# Patient Record
Sex: FEMALE | Race: BLACK OR AFRICAN AMERICAN | Employment: FULL TIME | URBAN - METROPOLITAN AREA
[De-identification: names, ages, dates, MRNs, and addresses within clinical notes are randomized per-mention and may not be internally consistent; named-entity substitution may affect disease eponyms.]

---

## 2017-07-20 ENCOUNTER — ED HISTORICAL/CONVERTED ENCOUNTER (OUTPATIENT)
Dept: OTHER | Age: 32
End: 2017-07-20

## 2020-01-07 ENCOUNTER — ED HISTORICAL/CONVERTED ENCOUNTER (OUTPATIENT)
Dept: OTHER | Age: 35
End: 2020-01-07

## 2020-09-28 ENCOUNTER — APPOINTMENT (OUTPATIENT)
Dept: GENERAL RADIOLOGY | Age: 35
End: 2020-09-28
Attending: EMERGENCY MEDICINE
Payer: COMMERCIAL

## 2020-09-28 ENCOUNTER — HOSPITAL ENCOUNTER (EMERGENCY)
Age: 35
Discharge: HOME | End: 2020-09-28
Attending: EMERGENCY MEDICINE
Payer: COMMERCIAL

## 2020-09-28 VITALS
OXYGEN SATURATION: 98 % | WEIGHT: 209 LBS | BODY MASS INDEX: 37.03 KG/M2 | DIASTOLIC BLOOD PRESSURE: 76 MMHG | SYSTOLIC BLOOD PRESSURE: 111 MMHG | HEIGHT: 63 IN | RESPIRATION RATE: 16 BRPM | HEART RATE: 79 BPM

## 2020-09-28 DIAGNOSIS — S39.012A STRAIN OF LUMBAR REGION, INITIAL ENCOUNTER: ICD-10-CM

## 2020-09-28 DIAGNOSIS — S16.1XXA STRAIN OF NECK MUSCLE, INITIAL ENCOUNTER: ICD-10-CM
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
## 2020-09-28 PROCEDURE — 72100 X-RAY EXAM L-S SPINE 2/3 VWS: CPT

## 2020-09-28 PROCEDURE — 72050 X-RAY EXAM NECK SPINE 4/5VWS: CPT

## 2020-09-28 PROCEDURE — 99281 EMR DPT VST MAYX REQ PHY/QHP: CPT

## 2020-09-28 RX ORDER — FLUTICASONE PROPIONATE 50 MCG
1 SPRAY, SUSPENSION (ML) NASAL
COMMUNITY

## 2020-09-28 RX ORDER — AZITHROMYCIN 1 G/1
POWDER, FOR SUSPENSION ORAL
COMMUNITY
Start: 2020-09-14 | End: 2021-08-26

## 2020-09-28 RX ORDER — DULOXETIN HYDROCHLORIDE 60 MG/1
CAPSULE, DELAYED RELEASE ORAL
COMMUNITY
Start: 2020-07-28 | End: 2021-08-26

## 2020-09-28 RX ORDER — HYDROCORTISONE 25 MG/G
CREAM TOPICAL
COMMUNITY
Start: 2020-07-30 | End: 2021-08-26

## 2020-09-28 RX ORDER — DIAZEPAM 2 MG/1
TABLET ORAL
COMMUNITY
Start: 2020-09-28 | End: 2021-08-26

## 2020-09-28 RX ORDER — TRAZODONE HYDROCHLORIDE 100 MG/1
TABLET ORAL
COMMUNITY
Start: 2020-09-28 | End: 2021-08-26
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
## 2021-08-26 ENCOUNTER — HOSPITAL ENCOUNTER (EMERGENCY)
Age: 36
Discharge: HOME | End: 2021-08-26
Attending: FAMILY MEDICINE
Payer: COMMERCIAL

## 2021-08-26 VITALS
DIASTOLIC BLOOD PRESSURE: 91 MMHG | RESPIRATION RATE: 16 BRPM | BODY MASS INDEX: 33.99 KG/M2 | SYSTOLIC BLOOD PRESSURE: 130 MMHG | TEMPERATURE: 98.1 F | HEIGHT: 65 IN | OXYGEN SATURATION: 99 % | HEART RATE: 66 BPM | WEIGHT: 204 LBS

## 2021-08-26 DIAGNOSIS — B97.89 VIRAL SINUSITIS: Primary | ICD-10-CM

## 2021-08-26 DIAGNOSIS — J06.9 UPPER RESPIRATORY TRACT INFECTION, UNSPECIFIED TYPE: ICD-10-CM

## 2021-08-26 DIAGNOSIS — J32.9 VIRAL SINUSITIS: Primary | ICD-10-CM

## 2021-08-26 PROCEDURE — 99281 EMR DPT VST MAYX REQ PHY/QHP: CPT

## 2021-08-26 RX ORDER — PREDNISONE 20 MG/1
40 TABLET ORAL DAILY
Qty: 10 TABLET | Refills: 0 | Status: SHIPPED | OUTPATIENT
Start: 2021-08-26 | End: 2021-08-31

## 2021-08-26 NOTE — ED PROVIDER NOTES
EMERGENCY DEPARTMENT HISTORY AND PHYSICAL EXAM      Date: 8/26/2021  Patient Name: Kodi Lee    History of Presenting Illness     Chief Complaint   Patient presents with    Sinus Infection       History Provided By:     HPI: Kodi Lee, is an extremely pleasant 39 y.o. female presenting to the ED with a chief complaint of sinus infection. Patient endorses tenderness of her sinuses for 2 days. She is also feeling congested. No thick drainage from her nares. Dry cough as well. No chest pain or shortness of breath. No fevers, chills or rigors. She is eating and drinking well. No alleviating or aggravating factors. There are no other complaints, changes, or physical findings at this time. PCP: None    No current facility-administered medications on file prior to encounter. Current Outpatient Medications on File Prior to Encounter   Medication Sig Dispense Refill    fluticasone propionate (FLONASE) 50 mcg/actuation nasal spray 1 Spray by Nasal route.  [DISCONTINUED] azithromycin (ZITHROMAX) 1 gram powder DISSOLVE 1 PACKET IN 2 OUNCES OF WATER AND TAKE BY MOUTH AS A ONE TIME DOSE      [DISCONTINUED] diazePAM (VALIUM) 2 mg tablet       [DISCONTINUED] DULoxetine (CYMBALTA) 60 mg capsule TAKE 1 CAPSULE BY MOUTH ONCE DAILY      [DISCONTINUED] hydrocortisone (HYTONE) 2.5 % topical cream APPLY CREAM TO AFFECTED AREA ON THE FACE TWICE DAILY FOR UP TO TWO WEEKS THEN AS NEEDED FOR FLARES. DO NOT USE FOR MORE THAN TWO WEEKS AT A      [DISCONTINUED] traZODone (DESYREL) 100 mg tablet       [DISCONTINUED] HYDROcodone-acetaminophen (NORCO) 5-325 mg per tablet Take 1-2 Tabs by mouth every six (6) hours as needed. Max Daily Amount: 8 Tabs. 20 Tab 0    [DISCONTINUED] ibuprofen (MOTRIN) 800 mg tablet Take 1 Tab by mouth every eight (8) hours. 21 Tab 1    [DISCONTINUED] ranitidine (ZANTAC) 150 mg tablet Take 150 mg by mouth two (2) times a day.  Indications: HEARTBURN      [DISCONTINUED] pnv w/o calcium-iron fum-fa 27-1 mg tab Take  by mouth. Indications: PREGNANCY         Past History     Past Medical History:  Past Medical History:   Diagnosis Date    Asthma     as a child    HX OTHER MEDICAL     GERD    Psychiatric disorder     Sickle-cell disease, unspecified     trait       Past Surgical History:  No past surgical history on file. Family History:  No family history on file. Social History:  Social History     Tobacco Use    Smoking status: Never Smoker    Smokeless tobacco: Never Used   Substance Use Topics    Alcohol use: No    Drug use: No       Allergies: Allergies   Allergen Reactions    Iodine Swelling    Keflex [Cephalexin] Hives         Review of Systems     Review of Systems   Constitutional: Negative for activity change, appetite change, chills, fatigue and fever. HENT: Negative for sore throat. See HPI   Eyes: Negative for photophobia and visual disturbance. Respiratory: Positive for cough. Negative for shortness of breath and wheezing. Cardiovascular: Negative for chest pain, palpitations and leg swelling. Gastrointestinal: Negative for abdominal pain, diarrhea, nausea and vomiting. Endocrine: Negative for cold intolerance and heat intolerance. Musculoskeletal: Negative for gait problem and joint swelling. Skin: Negative for color change and rash. Neurological: Negative for dizziness and headaches. Physical Exam     Physical Exam  Constitutional:       Appearance: She is well-developed. HENT:      Head: Normocephalic and atraumatic. Nose: Congestion present. Mouth/Throat:      Mouth: Mucous membranes are moist.      Pharynx: Oropharynx is clear. Eyes:      Conjunctiva/sclera: Conjunctivae normal.      Pupils: Pupils are equal, round, and reactive to light. Cardiovascular:      Rate and Rhythm: Normal rate and regular rhythm. Heart sounds: No murmur heard. Pulmonary:      Effort: No respiratory distress.       Breath sounds: No stridor. No wheezing, rhonchi or rales. Abdominal:      General: There is no distension. Tenderness: There is no abdominal tenderness. There is no rebound. Musculoskeletal:      Cervical back: Normal range of motion and neck supple. Skin:     General: Skin is warm and dry. Neurological:      General: No focal deficit present. Mental Status: She is alert and oriented to person, place, and time. Psychiatric:         Mood and Affect: Mood normal.         Behavior: Behavior normal.         Lab and Diagnostic Study Results     Labs -   No results found for this or any previous visit (from the past 12 hour(s)). Radiologic Studies -   @lastxrresult@  CT Results  (Last 48 hours)    None        CXR Results  (Last 48 hours)    None            Medical Decision Making   - I am the first provider for this patient. - I reviewed the vital signs, available nursing notes, past medical history, past surgical history, family history and social history. - Initial assessment performed. The patients presenting problems have been discussed, and they are in agreement with the care plan formulated and outlined with them. I have encouraged them to ask questions as they arise throughout their visit. Vital Signs-Reviewed the patient's vital signs. Patient Vitals for the past 12 hrs:   Temp Pulse Resp BP SpO2   08/26/21 0904 98.1 °F (36.7 °C) 66 16 (!) 130/91 99 %         ED Course/ Provider Notes (Medical Decision Making):     Patient presented to the emergency department with a chief complaint of sinus pressure, congestion, dry cough  On examination the patient is nontoxic and well-appearing. Vitals were reviewed per above. Lungs are clear to auscultation bilaterally. Oxygen saturation 99% on room air. Noted nasal congestion. No signs of bacterial sinusitis. Prescription for prednisone for viral sinusitis and likely self-limiting viral URI.     Alexander Toussaint was given a thorough list of signs and symptoms that would warrant an immediate return to the emergency department. Otherwise Kodi Lee will follow up with PCP. Procedures   Medical Decision Makingedical Decision Making  Performed by: Lucia Bruno DO  Procedures  None       Disposition   Disposition:     Home     All of the diagnostic tests were reviewed and questions answered. Diagnosis, care plan and treatment options were discussed. The patient understands the instructions and will follow up as directed. The patients results have been reviewed with them. They have been counseled regarding their diagnosis. The patient verbally convey understanding and agreement of the signs, symptoms, diagnosis, treatment and prognosis and additionally agrees to follow up as recommended with their PCP in 24 - 48 hours. They also agree with the care-plan and convey that all of their questions have been answered. I have also put together some discharge instructions for them that include: 1) educational information regarding their diagnosis, 2) how to care for their diagnosis at home, as well a 3) list of reasons why they would want to return to the ED prior to their follow-up appointment, should their condition change. DISCHARGE PLAN:    1. Current Discharge Medication List      START taking these medications    Details   predniSONE (DELTASONE) 20 mg tablet Take 40 mg by mouth daily for 5 days. With Breakfast  Qty: 10 Tablet, Refills: 0         CONTINUE these medications which have NOT CHANGED    Details   fluticasone propionate (FLONASE) 50 mcg/actuation nasal spray 1 [place] by Nasal route. 2.   Follow-up Information     Follow up With Specialties Details Why Contact Info    Your primary care doctor  Schedule an appointment as soon as possible for a visit               3.  Return to ED if worse       4.    Current Discharge Medication List      START taking these medications    Details   predniSONE (DELTASONE) 20 mg tablet Take 40 mg by mouth daily for 5 days. With Breakfast  Qty: 10 Tablet, Refills: 0  Start date: 8/26/2021, End date: 8/31/2021               Diagnosis     Clinical Impression:    1. Viral sinusitis    2. Upper respiratory tract infection, unspecified type        Attestations:    Cristal Batista, DO    Please note that this dictation was completed with [employer], the computer voice recognition software. Quite often unanticipated grammatical, syntax, homophones, and other interpretive errors are inadvertently transcribed by the computer software. Please disregard these errors. Please excuse any errors that have escaped final proofreading. Thank you.

## 2021-09-29 ENCOUNTER — OFFICE VISIT (OUTPATIENT)
Dept: ENDOCRINOLOGY | Age: 36
End: 2021-09-29
Payer: COMMERCIAL

## 2021-09-29 VITALS
DIASTOLIC BLOOD PRESSURE: 38 MMHG | HEART RATE: 81 BPM | SYSTOLIC BLOOD PRESSURE: 129 MMHG | BODY MASS INDEX: 33.63 KG/M2 | OXYGEN SATURATION: 98 % | WEIGHT: 197 LBS | HEIGHT: 64 IN

## 2021-09-29 DIAGNOSIS — E66.9 OBESITY (BMI 30-39.9): ICD-10-CM

## 2021-09-29 DIAGNOSIS — E28.2 PCOS (POLYCYSTIC OVARIAN SYNDROME): ICD-10-CM

## 2021-09-29 DIAGNOSIS — E03.9 HYPOTHYROIDISM, UNSPECIFIED TYPE: Primary | ICD-10-CM

## 2021-09-29 DIAGNOSIS — R53.83 FATIGUE, UNSPECIFIED TYPE: ICD-10-CM

## 2021-09-29 LAB
CHP ED QC CHECK: NORMAL
GLUCOSE BLD-MCNC: 95 MG/DL
HBA1C MFR BLD: 3.5 %

## 2021-09-29 PROCEDURE — 99203 OFFICE O/P NEW LOW 30 MIN: CPT | Performed by: INTERNAL MEDICINE

## 2021-09-29 PROCEDURE — 83036 HEMOGLOBIN GLYCOSYLATED A1C: CPT | Performed by: INTERNAL MEDICINE

## 2021-09-29 PROCEDURE — 82962 GLUCOSE BLOOD TEST: CPT | Performed by: INTERNAL MEDICINE

## 2021-09-29 RX ORDER — METFORMIN HYDROCHLORIDE 500 MG/1
1000 TABLET, EXTENDED RELEASE ORAL 2 TIMES DAILY
COMMUNITY
Start: 2021-09-14 | End: 2022-05-05 | Stop reason: SDUPTHER

## 2021-09-29 RX ORDER — BUPROPION HYDROCHLORIDE 150 MG/1
300 TABLET ORAL NIGHTLY
COMMUNITY
Start: 2021-07-28 | End: 2022-08-03

## 2021-09-29 ASSESSMENT — ENCOUNTER SYMPTOMS
EYES NEGATIVE: 1
ABDOMINAL PAIN: 1
RESPIRATORY NEGATIVE: 1
CONSTIPATION: 1

## 2021-09-29 NOTE — PROGRESS NOTES
9/29/2021    Assessment:       Diagnosis Orders   1. Hypothyroidism, unspecified type  TSH with Reflex    T4, Free    POCT Glucose    POCT glycosylated hemoglobin (Hb A1C)   2. Obesity (BMI 30-39.9)     3. PCOS (polycystic ovarian syndrome)     4.  Fatigue, unspecified type           PLAN:     Orders Placed This Encounter   Procedures    TSH with Reflex     Standing Status:   Future     Standing Expiration Date:   9/29/2022    T4, Free     Standing Status:   Future     Standing Expiration Date:   9/29/2022    Thyroid Peroxidase Antibody     Standing Status:   Future     Number of Occurrences:   1     Standing Expiration Date:   9/29/2022    Basic Metabolic Panel     Standing Status:   Future     Number of Occurrences:   1     Standing Expiration Date:   9/29/2022    Insulin, Total     Standing Status:   Future     Number of Occurrences:   1     Standing Expiration Date:   9/29/2022    Testosterone Free And Total, Non Male     Standing Status:   Future     Number of Occurrences:   1     Standing Expiration Date:   9/29/2022    POCT Glucose    POCT glycosylated hemoglobin (Hb A1C)     Continue current dose of Metformin labs to be done for total and free testosterone insulin level BMP thyroid function test thyroid antibodies for the treatment was based on the results of her labs more than 50% of 30 minutes spent patient education counseling      Orders Placed This Encounter   Procedures    POCT Glucose    POCT glycosylated hemoglobin (Hb A1C)       Subjective:     Chief Complaint   Patient presents with    New Patient    Hypothyroidism    Diabetes    Obesity     Vitals:    09/29/21 1125 09/29/21 1132   BP: (!) 146/91 (!) 129/38   Pulse: 81    SpO2: 98%    Weight: 197 lb (89.4 kg)    Height: 5' 4\" (1.626 m)      Wt Readings from Last 3 Encounters:   09/29/21 197 lb (89.4 kg)     BP Readings from Last 3 Encounters:   09/29/21 (!) 129/38     Patient self-referred here for obesity possible polycystic ovary syndrome hypothyroidism  Started on Metformin 500mg once or twice a day patient has not seen any relief from that complains of minutes. And also heavy periods pelvic cramps had a pelvic ultrasound done recently which was reviewed showing some cyst patient has had one pregnancy but miscarried in 2008 or 8 no children at this time  Has also gained 20 pounds over the last year or so  History of hypothyroidism many years ago was put on thyroid replacement but later was discontinued family history of thyroid disorder history of diabetes  Patient also complains of occasional symptoms of hypoglycemia improved with food suggestive of insulin resistance  Patient denies any facial hair hair loss acne    Other  This is a new (PCOS/insulin resistance) problem. The current episode started more than 1 year ago. The problem occurs intermittently. The problem has been waxing and waning. Associated symptoms include abdominal pain and fatigue. Pertinent negatives include no diaphoresis or weakness. Associated symptoms comments: Weight gain irregular menstrual periods. Nothing aggravates the symptoms. Treatments tried: Metformin. The treatment provided no relief. History of hypothyroidism currently not on replacement was started on Synthroid many years ago was discontinued later by primary care complains of weight gain fatigue difficulty losing weight    EXAMINATION: US PELVIS AND TRANSVAG   HISTORY: Irregular periods   COMPARISON: No relevant comparison available. TECHNIQUE: Transabdominal and transvaginal sonographic examination. FINDINGS:   UTERUS: Normal size and appearance. Uterus size: 8.5 x 5.3 x 4.5 cm. ENDOMETRIUM: Normal homogeneous appearance. Endometrial thickness: 7 mm   RIGHT OVARY: Contains a 1.8 cm dominant follicle. Duplex Doppler demonstrates   normal waveform and flow; resistive index 0.47. Ovary size: 3.6 x 2.3 x 2.1 cm   LEFT OVARY: Normal size and appearance.  Duplex Doppler demonstrates normal waveform and flow; resistive index 0.46. Ovary size: 2.8 x 2.2 x 1.6 cm   CUL-DE-SAC: Unremarkable. No significant free fluid. BLADDER: Unremarkable. OTHER: None. IMPRESSION:   1. No abnormal or suspicious findings to account for patient's symptoms. Electronically authenticated by: Brent Cox Date: 2021-09-13 13:11     No past medical history on file. No past surgical history on file. Social History     Socioeconomic History    Marital status:      Spouse name: Not on file    Number of children: Not on file    Years of education: Not on file    Highest education level: Not on file   Occupational History    Not on file   Tobacco Use    Smoking status: Never Smoker    Smokeless tobacco: Never Used   Substance and Sexual Activity    Alcohol use: Not on file    Drug use: Not on file    Sexual activity: Not on file   Other Topics Concern    Not on file   Social History Narrative    Not on file     Social Determinants of Health     Financial Resource Strain:     Difficulty of Paying Living Expenses:    Food Insecurity:     Worried About Running Out of Food in the Last Year:     [address] in the Last Year:    Transportation Needs:     Lack of Transportation (Medical):  Lack of Transportation (Non-Medical):    Physical Activity:     Days of Exercise per Week:     Minutes of Exercise per Session:    Stress:     Feeling of Stress :    Social Connections:     Frequency of Communication with Friends and Family:     Frequency of Social Gatherings with Friends and Family:     Attends Nondenominational Services:     Active Member of Clubs or Organizations:     Attends Club or Organization Meetings:     Marital Status:    Intimate Partner Violence:     Fear of Current or Ex-Partner:     Emotionally Abused:     Physically Abused:     Sexually Abused:      No family history on file.   No Known Allergies    Current Outpatient Medications:     metFORMIN (GLUCOPHAGE-XR) 500 MG extended release tablet, Take 500 mg by mouth nightly, Disp: , Rfl:     buPROPion (WELLBUTRIN XL) 150 MG extended release tablet, Take 150 mg by mouth nightly, Disp: , Rfl:   Lab Results   Component Value Date    GLUCOSE 95 09/29/2021     No results found for: WBC, HGB, HCT, MCV, PLT  No results found for: LABA1C  No results found for: CHOLFAST, TRIGLYCFAST, HDL, LDLCALC, CHOL, TRIG  No results found for: TESTM  No results found for: TSH, TSHREFLEX, FT3, T4FREE  No results found for: TPOABS    Review of Systems   Constitutional: Positive for fatigue and unexpected weight change. Negative for diaphoresis. Eyes: Negative. Respiratory: Negative. Cardiovascular: Negative. Gastrointestinal: Positive for abdominal pain and constipation. Endocrine: Negative. Negative for cold intolerance and heat intolerance. Genitourinary: Positive for menstrual problem and pelvic pain. Skin: Negative. Neurological: Negative for weakness. Psychiatric/Behavioral: Positive for dysphoric mood. All other systems reviewed and are negative. Objective:   Physical Exam  Vitals reviewed. Constitutional:       General: She is not in acute distress. Appearance: Normal appearance. She is obese. HENT:      Head: Normocephalic and atraumatic. Hair is normal.      Right Ear: External ear normal.      Left Ear: External ear normal.      Nose: Nose normal.      Mouth/Throat:      Pharynx: Oropharynx is clear. Eyes:      General: No scleral icterus. Right eye: No discharge. Left eye: No discharge. Extraocular Movements: Extraocular movements intact. Conjunctiva/sclera: Conjunctivae normal.   Neck:      Trachea: Trachea normal.   Cardiovascular:      Rate and Rhythm: Normal rate and regular rhythm. Heart sounds: Normal heart sounds. Pulmonary:      Effort: Pulmonary effort is normal.      Breath sounds: Normal breath sounds. No wheezing or rhonchi.    Abdominal:      Palpations: Abdomen is soft. Musculoskeletal:         General: Normal range of motion. Cervical back: Normal range of motion and neck supple. Skin:     General: Skin is warm and dry. Neurological:      General: No focal deficit present. Mental Status: She is alert and oriented to person, place, and time.    Psychiatric:         Mood and Affect: Mood normal.         Behavior: Behavior normal.

## 2021-10-03 ENCOUNTER — HOSPITAL ENCOUNTER (EMERGENCY)
Age: 36
Discharge: LEFT BEFORE DISCHARGE | End: 2021-10-03
Admitting: EMERGENCY MEDICINE
Payer: MEDICAID

## 2021-10-03 VITALS
HEART RATE: 66 BPM | HEIGHT: 66 IN | OXYGEN SATURATION: 97 % | BODY MASS INDEX: 32.14 KG/M2 | WEIGHT: 200 LBS | DIASTOLIC BLOOD PRESSURE: 91 MMHG | SYSTOLIC BLOOD PRESSURE: 120 MMHG | TEMPERATURE: 99 F | RESPIRATION RATE: 16 BRPM

## 2021-10-03 PROCEDURE — 75810000275 HC EMERGENCY DEPT VISIT NO LEVEL OF CARE

## 2021-10-03 RX ORDER — DIAZEPAM 2 MG/1
2 TABLET ORAL
COMMUNITY

## 2021-10-03 NOTE — ED TRIAGE NOTES
Yesterday morning h/a, intermittent rash scattered, goes away when takes Benadryl, return when Benadryl wears off

## 2021-10-04 ENCOUNTER — HOSPITAL ENCOUNTER (EMERGENCY)
Age: 36
Discharge: HOME | End: 2021-10-04
Payer: COMMERCIAL

## 2021-10-04 VITALS
OXYGEN SATURATION: 98 % | BODY MASS INDEX: 33.32 KG/M2 | SYSTOLIC BLOOD PRESSURE: 123 MMHG | WEIGHT: 200 LBS | HEIGHT: 65 IN | RESPIRATION RATE: 14 BRPM | TEMPERATURE: 98.2 F | DIASTOLIC BLOOD PRESSURE: 77 MMHG | HEART RATE: 83 BPM

## 2021-10-04 DIAGNOSIS — R51.9 SINUS HEADACHE: Primary | ICD-10-CM

## 2021-10-04 DIAGNOSIS — L50.1 URTICARIA, IDIOPATHIC: ICD-10-CM

## 2021-10-04 DIAGNOSIS — T78.40XA ALLERGIC REACTION, INITIAL ENCOUNTER: ICD-10-CM

## 2021-10-04 PROCEDURE — 99282 EMERGENCY DEPT VISIT SF MDM: CPT

## 2021-10-04 RX ORDER — EPINEPHRINE 0.3 MG/.3ML
0.3 INJECTION SUBCUTANEOUS
Qty: 1 EACH | Refills: 0 | Status: SHIPPED | OUTPATIENT
Start: 2021-10-04 | End: 2021-10-04

## 2021-10-04 RX ORDER — PREDNISONE 10 MG/1
TABLET ORAL
Qty: 21 TABLET | Refills: 0 | Status: SHIPPED | OUTPATIENT
Start: 2021-10-04 | End: 2022-08-04

## 2021-10-04 RX ORDER — LORATADINE 10 MG/1
10 TABLET ORAL DAILY
Qty: 30 TABLET | Refills: 0 | Status: SHIPPED | OUTPATIENT
Start: 2021-10-04 | End: 2021-11-03

## 2021-10-04 RX ORDER — AMOXICILLIN AND CLAVULANATE POTASSIUM 875; 125 MG/1; MG/1
1 TABLET, FILM COATED ORAL 2 TIMES DAILY
Qty: 20 TABLET | Refills: 0 | Status: SHIPPED | OUTPATIENT
Start: 2021-10-04 | End: 2021-10-14
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
## 2021-10-04 NOTE — ED TRIAGE NOTES
Intermittent small patches of rash that \"pops up on my wrist and my neck\" x3 days. States \"you guys were too busy yesterday, so I left\". States took benadryl last around noon today, now c/o HA; denies rash, difficulty breathing, other associated c/o.

## 2021-10-29 ENCOUNTER — OFFICE VISIT (OUTPATIENT)
Dept: ENDOCRINOLOGY | Age: 36
End: 2021-10-29
Payer: COMMERCIAL

## 2021-10-29 VITALS
DIASTOLIC BLOOD PRESSURE: 79 MMHG | WEIGHT: 198 LBS | OXYGEN SATURATION: 98 % | HEART RATE: 75 BPM | BODY MASS INDEX: 37.38 KG/M2 | HEIGHT: 61 IN | SYSTOLIC BLOOD PRESSURE: 114 MMHG

## 2021-10-29 DIAGNOSIS — E28.2 PCOS (POLYCYSTIC OVARIAN SYNDROME): ICD-10-CM

## 2021-10-29 DIAGNOSIS — E06.3 HASHIMOTO'S THYROIDITIS: ICD-10-CM

## 2021-10-29 DIAGNOSIS — E03.9 HYPOTHYROIDISM, UNSPECIFIED TYPE: ICD-10-CM

## 2021-10-29 DIAGNOSIS — E03.9 HYPOTHYROIDISM, UNSPECIFIED TYPE: Primary | ICD-10-CM

## 2021-10-29 LAB
T4 FREE: 1.15 NG/DL (ref 0.84–1.68)
TSH REFLEX: 1.78 UIU/ML (ref 0.44–3.86)

## 2021-10-29 PROCEDURE — 99213 OFFICE O/P EST LOW 20 MIN: CPT | Performed by: INTERNAL MEDICINE

## 2021-10-29 NOTE — PROGRESS NOTES
10/29/2021    Assessment:       Diagnosis Orders   1. Hypothyroidism, unspecified type  TSH with Reflex    T4, Free   2. PCOS (polycystic ovarian syndrome)     3. Hashimoto's thyroiditis           PLAN:     Patient educated on Hashimoto thyroiditis to get thyroid function test done today follow-up in 4 weeks  Orders Placed This Encounter   Procedures    TSH with Reflex     Standing Status:   Future     Standing Expiration Date:   10/29/2022    T4, Free     Standing Status:   Future     Standing Expiration Date:   10/29/2022       Subjective:     Chief Complaint   Patient presents with    Hypothyroidism    Obesity    Other     PCOS     Vitals:    10/29/21 1000   BP: 114/79   Pulse: 75   SpO2: 98%   Weight: 198 lb (89.8 kg)   Height: 5' 1\" (1.549 m)     Wt Readings from Last 3 Encounters:   10/29/21 198 lb (89.8 kg)   09/29/21 197 lb (89.4 kg)     BP Readings from Last 3 Encounters:   10/29/21 114/79   09/29/21 (!) 129/38     Patient is here for follow-up on her hypothyroidism not on replacement no known history of Hashimoto thyroiditis  Has had history of miscarriages  History of polycystic ovary syndrome on Metformin  Labs were reviewed showing normal glucose insulin level thyroid antibodies high consistent with Hashimoto thyroiditis  Testosterone levels were normal    Other  This is a chronic (Hypothyroidism) problem. The current episode started more than 1 year ago. The problem has been waxing and waning. Associated symptoms include fatigue. Nothing aggravates the symptoms. She has tried nothing for the symptoms. Results for Gael Fraire (MRN [id number]) as of 10/29/2021 11:00   Ref.  Range 9/29/2021 12:55   Sodium Latest Ref Range: 135 - 144 mEq/L 139   Potassium Latest Ref Range: 3.4 - 4.9 mEq/L 4.3   Chloride Latest Ref Range: 95 - 107 mEq/L 100   CO2 Latest Ref Range: 20 - 31 mEq/L 27   BUN Latest Ref Range: 6 - 20 mg/dL 10   Creatinine Latest Ref Range: 0.50 - 0.90 mg/dL 0.67   Anion Gap Latest Ref Range: 9 - 15 mEq/L 12   GFR Non- Latest Ref Range: >60  >60.0   GFR African American Latest Ref Range: >60  >60.0   Glucose Latest Ref Range: 70 - 99 mg/dL 84   Calcium Latest Ref Range: 8.5 - 9.9 mg/dL 9.3   Sex Hormone Binding Latest Ref Range: 30 - 135 nmol/L 37   Testosterone, Free Latest Ref Range: 1.3 - 9.2 pg/mL 3.3   Testosterone, Females/Children Latest Ref Range: 9 - 55 ng/dL 21   Insulin Latest Ref Range: 2.6 - 24.9 uIU/mL 6.6   THYROID PEROXIDASE (TPO) ABS Latest Ref Range: 0.0 - 25.0 IU/mL 205.0 (H)       History reviewed. No pertinent past medical history. History reviewed. No pertinent surgical history. Social History     Socioeconomic History    Marital status:      Spouse name: Not on file    Number of children: Not on file    Years of education: Not on file    Highest education level: Not on file   Occupational History    Not on file   Tobacco Use    Smoking status: Never Smoker    Smokeless tobacco: Never Used   Substance and Sexual Activity    Alcohol use: Not on file    Drug use: Not on file    Sexual activity: Not on file   Other Topics Concern    Not on file   Social History Narrative    Not on file     Social Determinants of Health     Financial Resource Strain:     Difficulty of Paying Living Expenses:    Food Insecurity:     Worried About Running Out of Food in the Last Year:     [address] in the Last Year:    Transportation Needs:     Lack of Transportation (Medical):      Lack of Transportation (Non-Medical):    Physical Activity:     Days of Exercise per Week:     Minutes of Exercise per Session:    Stress:     Feeling of Stress :    Social Connections:     Frequency of Communication with Friends and Family:     Frequency of Social Gatherings with Friends and Family:     Attends [employer] Services:     Active Member of Clubs or Organizations:     Attends Club or Organization Meetings:     Marital Status:    Intimate Partner Violence:     Fear of Current or Ex-Partner:     Emotionally Abused:     Physically Abused:     Sexually Abused:      History reviewed. No pertinent family history. No Known Allergies    Current Outpatient Medications:     metFORMIN (GLUCOPHAGE-XR) 500 MG extended release tablet, Take 500 mg by mouth nightly, Disp: , Rfl:     buPROPion (WELLBUTRIN XL) 150 MG extended release tablet, Take 150 mg by mouth nightly, Disp: , Rfl:   Lab Results   Component Value Date     09/29/2021    K 4.3 09/29/2021     09/29/2021    CO2 27 09/29/2021    BUN 10 09/29/2021    CREATININE 0.67 09/29/2021    GLUCOSE 84 09/29/2021    CALCIUM 9.3 09/29/2021    LABGLOM >60.0 09/29/2021    GFRAA >60.0 09/29/2021     No results found for: WBC, HGB, HCT, MCV, PLT  Lab Results   Component Value Date    LABA1C 3.5 09/29/2021     No results found for: CHOLFAST, TRIGLYCFAST, HDL, LDLCALC, CHOL, TRIG  No results found for: TESTM  No results found for: TSH, TSHREFLEX, FT3, T4FREE  Lab Results   Component Value Date    TPOABS 205.0 (H) 09/29/2021       Review of Systems   Constitutional: Positive for fatigue. Objective:   Physical Exam  Vitals reviewed. Constitutional:       Appearance: Normal appearance. She is obese. HENT:      Head: Normocephalic and atraumatic. Hair is normal.      Right Ear: External ear normal.      Left Ear: External ear normal.      Nose: Nose normal.   Eyes:      General: No scleral icterus. Right eye: No discharge. Left eye: No discharge. Extraocular Movements: Extraocular movements intact. Conjunctiva/sclera: Conjunctivae normal.   Neck:      Trachea: Trachea normal.   Cardiovascular:      Rate and Rhythm: Normal rate. Pulmonary:      Effort: Pulmonary effort is normal.   Musculoskeletal:         General: Normal range of motion. Cervical back: Normal range of motion and neck supple. Neurological:      General: No focal deficit present.       Mental Status: She is alert and oriented to person, place, and time.    Psychiatric:         Mood and Affect: Mood normal.         Behavior: Behavior normal.

## 2021-11-29 ENCOUNTER — OFFICE VISIT (OUTPATIENT)
Dept: ENDOCRINOLOGY | Age: 36
End: 2021-11-29
Payer: COMMERCIAL

## 2021-11-29 VITALS
SYSTOLIC BLOOD PRESSURE: 118 MMHG | HEIGHT: 61 IN | WEIGHT: 202 LBS | DIASTOLIC BLOOD PRESSURE: 83 MMHG | BODY MASS INDEX: 38.14 KG/M2 | HEART RATE: 81 BPM

## 2021-11-29 DIAGNOSIS — E03.9 HYPOTHYROIDISM, UNSPECIFIED TYPE: Primary | ICD-10-CM

## 2021-11-29 DIAGNOSIS — E04.9 GOITER: ICD-10-CM

## 2021-11-29 DIAGNOSIS — E06.3 HASHIMOTO'S THYROIDITIS: ICD-10-CM

## 2021-11-29 DIAGNOSIS — E28.2 PCOS (POLYCYSTIC OVARIAN SYNDROME): ICD-10-CM

## 2021-11-29 PROCEDURE — 99213 OFFICE O/P EST LOW 20 MIN: CPT | Performed by: INTERNAL MEDICINE

## 2021-11-29 ASSESSMENT — ENCOUNTER SYMPTOMS: TROUBLE SWALLOWING: 0

## 2021-11-29 NOTE — PROGRESS NOTES
11/29/2021    Assessment:       Diagnosis Orders   1. Hypothyroidism, unspecified type  TSH without Reflex    T4, Free   2. Goiter  US HEAD NECK SOFT TISSUE THYROID   3. PCOS (polycystic ovarian syndrome)     4. Hashimoto's thyroiditis           PLAN:     Continue Metformin 500 mg  at current dose  For thyroid replacement  Get thyroid ultrasound  Follow-up in 2 to 3 months    Subjective:     Chief Complaint   Patient presents with    Hypothyroidism    Other     PCOS     Vitals:    11/29/21 1347   BP: 118/83   Site: Left Upper Arm   Position: Sitting   Cuff Size: Large Adult   Pulse: 81   Weight: 202 lb (91.6 kg)   Height: 5' 1\" (1.549 m)     Wt Readings from Last 3 Encounters:   11/29/21 202 lb (91.6 kg)   11/17/21 201 lb (91.2 kg)   10/29/21 198 lb (89.8 kg)     BP Readings from Last 3 Encounters:   11/29/21 118/83   11/17/21 (!) 132/90   10/29/21 114/79     Follow-up on the polycystic ovary syndrome Hashimoto thyroiditis thyroid function test was done which was normal patient denies any neck pain difficulty swallowing obesity with BMI of 38    Other  This is a recurrent (Hashimoto thyroiditis) problem. The current episode started more than 1 month ago. The problem has been waxing and waning. Pertinent negatives include no neck pain. Nothing aggravates the symptoms. She has tried nothing for the symptoms. Results for Barbie Sykes (MRN [id number]) as of 11/29/2021 14:07   Ref.  Range 9/29/2021 12:55 10/29/2021 10:32   Sodium Latest Ref Range: 135 - 144 mEq/L 139    Potassium Latest Ref Range: 3.4 - 4.9 mEq/L 4.3    Chloride Latest Ref Range: 95 - 107 mEq/L 100    CO2 Latest Ref Range: 20 - 31 mEq/L 27    BUN Latest Ref Range: 6 - 20 mg/dL 10    Creatinine Latest Ref Range: 0.50 - 0.90 mg/dL 0.67    Anion Gap Latest Ref Range: 9 - 15 mEq/L 12    GFR Non- Latest Ref Range: >60  >60.0    GFR African American Latest Ref Range: >60  >60.0    Glucose Latest Ref Range: 70 - 99 mg/dL 84    Calcium Latest Ref Range: 8.5 - 9.9 mg/dL 9.3    Sex Hormone Binding Latest Ref Range: 30 - 135 nmol/L 37    Testosterone, Free Latest Ref Range: 1.3 - 9.2 pg/mL 3.3    Testosterone, Females/Children Latest Ref Range: 9 - 55 ng/dL 21    Insulin Latest Ref Range: 2.6 - 24.9 uIU/mL 6.6    TSH Latest Ref Range: 0.440 - 3.860 uIU/mL  1.780   THYROID PEROXIDASE (TPO) ABS Latest Ref Range: 0.0 - 25.0 IU/mL 205.0 (H)    T4 Free Latest Ref Range: 0.84 - 1.68 ng/dL  1.15       Past Medical History:   Diagnosis Date    Hyperthyroidism     PCOS (polycystic ovarian syndrome)     Prediabetes      No past surgical history on file. Social History     Socioeconomic History    Marital status:      Spouse name: Not on file    Number of children: Not on file    Years of education: Not on file    Highest education level: Not on file   Occupational History    Not on file   Tobacco Use    Smoking status: Never Smoker    Smokeless tobacco: Never Used   Vaping Use    Vaping Use: Never used   Substance and Sexual Activity    Alcohol use: Never    Drug use: Never    Sexual activity: Yes   Other Topics Concern    Not on file   Social History Narrative    Not on file     Social Determinants of Health     Financial Resource Strain: Low Risk     Difficulty of Paying Living Expenses: Not hard at all   Food Insecurity: No Food Insecurity    Worried About Running Out of Food in the Last Year: Never true    [address] in the Last Year: Never true   Transportation Needs:     Lack of Transportation (Medical): Not on file    Lack of Transportation (Non-Medical):  Not on file   Physical Activity:     Days of Exercise per Week: Not on file    Minutes of Exercise per Session: Not on file   Stress:     Feeling of Stress : Not on file   Social Connections:     Frequency of Communication with Friends and Family: Not on file    Frequency of Social Gatherings with Friends and Family: Not on file    Attends [employer] Services: Not on file   [address] [place] or Organizations: Not on file    Attends Club or Organization Meetings: Not on file    Marital Status: Not on file   Intimate Partner Violence:     Fear of Current or Ex-Partner: Not on file    Emotionally Abused: Not on file    Physically Abused: Not on file    Sexually Abused: Not on file   Housing Stability:     Unable to Pay for Housing in the Last Year: Not on file    Number of [place] in the Last Year: Not on file    Unstable Housing in the Last Year: Not on file     No family history on file. No Known Allergies    Current Outpatient Medications:     Multiple Vitamin (MULTIVITAMIN PO), Take by mouth, Disp: , Rfl:     Bisacodyl (LAXATIVE PO), Take by mouth OTC, Disp: , Rfl:     Pyridoxine HCl (B-6 PO), Take by mouth OTC, Disp: , Rfl:     metFORMIN (GLUCOPHAGE-XR) 500 MG extended release tablet, Take 500 mg by mouth 2 times daily , Disp: , Rfl:     buPROPion (WELLBUTRIN XL) 150 MG extended release tablet, Take 300 mg by mouth nightly , Disp: , Rfl:   Lab Results   Component Value Date     09/29/2021    K 4.3 09/29/2021     09/29/2021    CO2 27 09/29/2021    BUN 10 09/29/2021    CREATININE 0.67 09/29/2021    GLUCOSE 84 09/29/2021    CALCIUM 9.3 09/29/2021    LABGLOM >60.0 09/29/2021    GFRAA >60.0 09/29/2021     No results found for: WBC, HGB, HCT, MCV, PLT  Lab Results   Component Value Date    LABA1C 3.5 09/29/2021     No results found for: CHOLFAST, TRIGLYCFAST, HDL, LDLCALC, CHOL, TRIG  No results found for: TESTM  Lab Results   Component Value Date    TSHREFLEX 1.780 10/29/2021    T4FREE 1.15 10/29/2021     Lab Results   Component Value Date    TPOABS 205.0 (H) 09/29/2021       Review of Systems   HENT: Negative for trouble swallowing. Cardiovascular: Negative. Endocrine: Negative. Musculoskeletal: Negative for neck pain. All other systems reviewed and are negative. Objective:   Physical Exam  Vitals reviewed. Constitutional:       Appearance: Normal appearance. She is obese. HENT:      Head: Normocephalic and atraumatic. Hair is normal.      Right Ear: External ear normal.      Left Ear: External ear normal.      Nose: Nose normal.   Eyes:      General: No scleral icterus. Right eye: No discharge. Left eye: No discharge. Extraocular Movements: Extraocular movements intact. Conjunctiva/sclera: Conjunctivae normal.   Neck:      Thyroid: Thyromegaly present. Trachea: Trachea normal.     Cardiovascular:      Rate and Rhythm: Normal rate. Pulmonary:      Effort: Pulmonary effort is normal.   Musculoskeletal:         General: Normal range of motion. Cervical back: Normal range of motion and neck supple. Neurological:      General: No focal deficit present. Mental Status: She is alert and oriented to person, place, and time.    Psychiatric:         Mood and Affect: Mood normal.         Behavior: Behavior normal.

## 2021-12-03 ENCOUNTER — HOSPITAL ENCOUNTER (OUTPATIENT)
Dept: ULTRASOUND IMAGING | Age: 36
Discharge: HOME | End: 2021-12-05
Payer: COMMERCIAL

## 2021-12-03 DIAGNOSIS — E04.9 GOITER: ICD-10-CM

## 2021-12-03 PROCEDURE — 76536 US EXAM OF HEAD AND NECK: CPT

## 2022-01-04 ENCOUNTER — TELEPHONE (OUTPATIENT)
Dept: ENDOCRINOLOGY | Age: 37
End: 2022-01-04

## 2022-03-07 ENCOUNTER — OFFICE VISIT (OUTPATIENT)
Dept: ENDOCRINOLOGY | Age: 37
End: 2022-03-07
Payer: COMMERCIAL

## 2022-03-07 VITALS
DIASTOLIC BLOOD PRESSURE: 78 MMHG | SYSTOLIC BLOOD PRESSURE: 116 MMHG | HEART RATE: 68 BPM | HEIGHT: 61 IN | BODY MASS INDEX: 39.65 KG/M2 | WEIGHT: 210 LBS

## 2022-03-07 DIAGNOSIS — E66.9 OBESITY (BMI 30-39.9): ICD-10-CM

## 2022-03-07 DIAGNOSIS — E03.9 HYPOTHYROIDISM, UNSPECIFIED TYPE: Primary | ICD-10-CM

## 2022-03-07 DIAGNOSIS — E28.2 PCOS (POLYCYSTIC OVARIAN SYNDROME): ICD-10-CM

## 2022-03-07 PROCEDURE — 99213 OFFICE O/P EST LOW 20 MIN: CPT | Performed by: INTERNAL MEDICINE

## 2022-03-07 NOTE — PROGRESS NOTES
3/7/2022    Assessment:       Diagnosis Orders   1. Hypothyroidism, unspecified type  T4, Free    TSH   2. Obesity (BMI 30-39.9)     3. PCOS (polycystic ovarian syndrome)           PLAN:     Orders Placed This Encounter   Procedures    T4, Free     Standing Status:   Future     Standing Expiration Date:   3/7/2023    TSH     Standing Status:   Future     Standing Expiration Date:   3/7/2023     Continue Metformin at current dose follow-up in 3 to 6 months time    Subjective:     Chief Complaint   Patient presents with    Hypothyroidism    Hashimoto's Thyroiditis    Goiter    Other     PCOS     Vitals:    03/07/22 0937   BP: 116/78   Site: Left Upper Arm   Position: Sitting   Cuff Size: Large Adult   Pulse: 68   Weight: 210 lb (95.3 kg)   Height: 5' 1\" (1.549 m)     Wt Readings from Last 3 Encounters:   03/07/22 210 lb (95.3 kg)   02/09/22 212 lb 8 oz (96.4 kg)   11/29/21 202 lb (91.6 kg)     BP Readings from Last 3 Encounters:   03/07/22 116/78   02/09/22 130/84   11/29/21 118/83     Follow-up on  Hashimoto thyroiditis not on replacement history of PCOS obesity labs and thyroid ultrasound were reviewed thyroid function test was stable thyroid ultrasound shows small left upper lobe  nodule which is 4.8 mm size of the gland is in the normal range    Other  This is a chronic (Hypothyroidism) problem. The current episode started more than 1 year ago. The problem has been unchanged. Exacerbated by: Hashimoto thyroiditis. Treatments tried: Levothyroxine. The treatment provided moderate relief.         Labs reviewed from outside facility  Total T4 was 10.8  TSH was 1.29  3/3/2022    EXAMINATION: US HEAD NECK SOFT TISSUE THYROID       CLINICAL HISTORY: GOITER.       FINDINGS:        Right lobe measures 4.7 x 1.6 x 1.6 cm.       Left lobe measures 4.3 x 1.3 x 1.4 cm.       Isthmus measures 3.1 mm.       Right lobe demonstrates no discrete cystic or solid lesions.       In the left lobe, posteriorly in the lower pole, a 4.8 x 3.0 x 4.6 mm nearly completely solid, isoechoic, wider than tall, ill-defined, noncalcified nodule is identified.           Impression       CATEGORY 3: MILDLY SUSPICIOUS. NO BIOPSY REQUIRED. FOLLOW-UP IMAGING IF GREATER THAN OR EQUAL TO 1.5 CM. LEFT LOBE, LOWER POLE. Past Medical History:   Diagnosis Date    Hyperthyroidism     PCOS (polycystic ovarian syndrome)     Prediabetes      History reviewed. No pertinent surgical history. Social History     Socioeconomic History    Marital status:      Spouse name: Not on file    Number of children: Not on file    Years of education: Not on file    Highest education level: Not on file   Occupational History    Not on file   Tobacco Use    Smoking status: Never Smoker    Smokeless tobacco: Never Used   Vaping Use    Vaping Use: Never used   Substance and Sexual Activity    Alcohol use: Never    Drug use: Never    Sexual activity: Yes   Other Topics Concern    Not on file   Social History Narrative    Not on file     Social Determinants of Health     Financial Resource Strain: Low Risk     Difficulty of Paying Living Expenses: Not hard at all   Food Insecurity: No Food Insecurity    Worried About Running Out of Food in the Last Year: Never true    [address] in the Last Year: Never true   Transportation Needs:     Lack of Transportation (Medical): Not on file    Lack of Transportation (Non-Medical):  Not on file   Physical Activity:     Days of Exercise per Week: Not on file    Minutes of Exercise per Session: Not on file   Stress:     Feeling of Stress : Not on file   Social Connections:     Frequency of Communication with Friends and Family: Not on file    Frequency of Social Gatherings with Friends and Family: Not on file    Attends [employer] Services: Not on file    Active Member of Clubs or Organizations: Not on file    Attends Club or Organization Meetings: Not on file    Marital Status: Not on file   Intimate Partner Violence:     Fear of Current or Ex-Partner: Not on file    Emotionally Abused: Not on file    Physically Abused: Not on file    Sexually Abused: Not on file   Housing Stability:     Unable to Pay for Housing in the Last Year: Not on file    Number of Places Lived in the Last Year: Not on file    Unstable Housing in the Last Year: Not on file     History reviewed. No pertinent family history. No Known Allergies    Current Outpatient Medications:     Multiple Vitamin (MULTIVITAMIN PO), Take by mouth, Disp: , Rfl:     Bisacodyl (LAXATIVE PO), Take by mouth OTC, Disp: , Rfl:     Pyridoxine HCl (B-6 PO), Take by mouth OTC, Disp: , Rfl:     metFORMIN (GLUCOPHAGE-XR) 500 MG extended release tablet, Take 500 mg by mouth 2 times daily , Disp: , Rfl:     buPROPion (WELLBUTRIN XL) 150 MG extended release tablet, Take 300 mg by mouth nightly , Disp: , Rfl:   Lab Results   Component Value Date     09/29/2021    K 4.3 09/29/2021     09/29/2021    CO2 27 09/29/2021    BUN 10 09/29/2021    CREATININE 0.67 09/29/2021    GLUCOSE 84 09/29/2021    CALCIUM 9.3 09/29/2021    LABGLOM >60.0 09/29/2021    GFRAA >60.0 09/29/2021     Lab Results   Component Value Date    WBC 5.5 11/19/2021    HGB 13.0 11/19/2021    HCT 38.0 11/19/2021    MCV 88 11/19/2021     11/19/2021     Lab Results   Component Value Date    LABA1C 3.5 09/29/2021     Lab Results   Component Value Date    CHOLFAST 200 11/19/2021    TRIGLYCFAST 50 11/19/2021    HDL 65 11/19/2021    LDLCALC 125 11/19/2021     No results found for: TESTM  Lab Results   Component Value Date    TSHREFLEX 1.780 10/29/2021    T4FREE 1.15 10/29/2021     Lab Results   Component Value Date    TPOABS 205.0 (H) 09/29/2021       Review of Systems   Endocrine: Negative. All other systems reviewed and are negative. Objective:   Physical Exam  Vitals reviewed. Constitutional:       Appearance: Normal appearance. She is obese.    HENT:      Head: Normocephalic and atraumatic. Right Ear: External ear normal.      Left Ear: External ear normal.      Nose: Nose normal.   Eyes:      General: No scleral icterus. Right eye: No discharge. Left eye: No discharge. Extraocular Movements: Extraocular movements intact. Conjunctiva/sclera: Conjunctivae normal.   Cardiovascular:      Rate and Rhythm: Normal rate. Pulmonary:      Effort: Pulmonary effort is normal.   Musculoskeletal:         General: Normal range of motion. Cervical back: Normal range of motion and neck supple. Neurological:      General: No focal deficit present. Mental Status: She is alert and oriented to person, place, and time.    Psychiatric:         Mood and Affect: Mood normal.         Behavior: Behavior normal.

## 2022-04-27 ENCOUNTER — TELEPHONE (OUTPATIENT)
Dept: INTERNAL MEDICINE CLINIC | Age: 37
End: 2022-04-27

## 2022-04-27 NOTE — TELEPHONE ENCOUNTER
----- Message from Brittany Rey sent at 4/22/2022 10:20 AM EDT -----  Subject: Appointment Request    Reason for Call: Routine Physical Exam    QUESTIONS  Type of Appointment? Established Patient  Reason for appointment request? No appointments available during search  Additional Information for Provider? patient would like to come in for a   yearly physical please her and set something up   ---------------------------------------------------------------------------  --------------  CALL BACK INFO  What is the best way for the office to contact you? OK to leave message on   voicemail  Preferred Call Back Phone Number? [phone]  ---------------------------------------------------------------------------  --------------  SCRIPT ANSWERS  Relationship to Patient? Self  (If the patient has Medicare as their primary insurance coverage ask this   question) Are you requesting a Medicare Annual Wellness Visit? No  (Is the patient requesting a pap smear with their physical exam?)? No  (Is the patient requesting their annual physical and does not need PAP or   AWV per above?)? Yes   Have you been diagnosed with, awaiting test results for, or told that you   are suspected of having COVID-19 (Coronavirus)? (If patient has tested   negative or was tested as a requirement for work, school, or travel and   not based on symptoms, answer no)? No  Within the past 10 days have you developed any of the following symptoms   (answer no if symptoms have been present longer than 10 days or began   more than 10 days ago)? Fever or Chills, Cough, Shortness of breath or   difficulty breathing, Loss of taste or smell, Sore throat, Nasal   congestion, Sneezing or runny nose, Fatigue or generalized body aches   (answer no if pain is specific to a body part e.g. back pain), Diarrhea,   Headache? No  Have you had close contact with someone with COVID-19 in the last 7 days?    No  (Service Expert  click yes below to proceed with [employer] [employer]ual   Scheduling)?  Yes

## 2022-04-27 NOTE — TELEPHONE ENCOUNTER
Called pt lmvm last seen 8/14/2018 will need to come in as a new patient and she is booked until middle of August, please call back schedule

## 2022-08-03 PROBLEM — F33.1 MODERATE EPISODE OF RECURRENT MAJOR DEPRESSIVE DISORDER (HCC): Status: ACTIVE | Noted: 2022-08-03

## 2022-08-03 PROBLEM — E28.2 PCOS (POLYCYSTIC OVARIAN SYNDROME): Status: ACTIVE | Noted: 2022-08-03

## 2022-08-03 PROBLEM — E03.8 HYPOTHYROIDISM DUE TO HASHIMOTO'S THYROIDITIS: Status: ACTIVE | Noted: 2022-08-03

## 2022-08-03 PROBLEM — R73.03 PRE-DIABETES: Status: ACTIVE | Noted: 2022-08-03

## 2022-08-03 PROBLEM — R63.5 WEIGHT GAIN: Status: ACTIVE | Noted: 2022-08-03

## 2022-08-03 PROBLEM — E06.3 HYPOTHYROIDISM DUE TO HASHIMOTO'S THYROIDITIS: Status: ACTIVE | Noted: 2022-08-03

## 2022-08-04 ENCOUNTER — HOSPITAL ENCOUNTER (OUTPATIENT)
Dept: PREADMISSION TESTING | Age: 37
Discharge: HOME | End: 2022-08-04

## 2022-08-04 VITALS
TEMPERATURE: 97.5 F | SYSTOLIC BLOOD PRESSURE: 124 MMHG | WEIGHT: 207.9 LBS | DIASTOLIC BLOOD PRESSURE: 80 MMHG | HEIGHT: 66 IN | RESPIRATION RATE: 18 BRPM | BODY MASS INDEX: 33.41 KG/M2 | OXYGEN SATURATION: 100 % | HEART RATE: 61 BPM

## 2022-08-04 RX ORDER — OMEPRAZOLE 20 MG/1
20 TABLET, DELAYED RELEASE ORAL DAILY
COMMUNITY

## 2022-08-04 RX ORDER — PSEUDOEPHEDRINE HCL 30 MG
TABLET ORAL
COMMUNITY

## 2022-08-04 RX ORDER — CHOLECALCIFEROL (VITAMIN D3) 125 MCG
CAPSULE ORAL AS NEEDED
COMMUNITY

## 2022-08-04 NOTE — PERIOP NOTES
[address] [address] [facility]   MAIN OR                                  [phone]   MAIN PRE OP                          [phone]                                                                                AMBULATORY PRE OP          [phone]  PRE-ADMISSION TESTING    [phone]   Surgery Date:  8/11/2022       Is surgery arrival time given by surgeon? NO  If NO, [facility] staff will call you between 3 and 7pm the day before your surgery with your arrival time. (If your surgery is on a Monday, we will call you the Friday before.)    Call [phone] after 7pm Monday-Friday if you did not receive this call. INSTRUCTIONS BEFORE YOUR SURGERY   When You  Arrive Arrive at the [address] [facility] on the day of your surgery  Have your insurance card, photo ID, and any copayment (if needed)   Food   and   Drink NO food or drink after midnight the night before surgery    This means NO water, gum, mints, coffee, juice, etc.  No alcohol (beer, wine, liquor) 24 hours before and after surgery   Medications to   TAKE   Morning of Surgery MEDICATIONS TO TAKE THE MORNING OF SURGERY WITH A SIP OF WATER:   Prilosec    DO NOT take Sudafed the morning of surgery.    Medications  To  STOP      7 days before surgery Non-Steroidal anti-inflammatory Drugs (NSAID's): for example, Ibuprofen (Advil, Motrin), Naproxen (Aleve)  Aspirin, if taking for pain   Herbal supplements, vitamins, and fish oil  Other:  (Pain medications not listed above, including Tylenol may be taken)   Blood  Thinners N/A   Bathing Clothing  Jewelry  Valuables     If you shower the morning of surgery, please do not apply anything to your skin (lotions, powders, deodorant, or makeup, especially mascara)  Do not shave or trim anywhere 24 hours before surgery  Wear your hair loose or down; no pony-tails, buns, or metal hair clips  Wear loose, comfortable, clean clothes  Wear glasses instead of contacts  Leave money, valuables, and jewelry, including body piercings, at home       Going Home - or Spending the Night SAME-DAY SURGERY: You must have a responsible adult drive you home and stay with you 24 hours after surgery     Special Instructions        Follow all instructions so your surgery wont be cancelled. Please, be on time. If a situation occurs and you are delayed the day of surgery, call [phone]. If your physical condition changes (like a fever, cold, flu, etc.) call your surgeon. Home medication(s) reviewed and verified via   LIST   VERBAL   during PAT appointment. The patient was contacted by  IN-PERSON  The patient verbalizes understanding of all instructions and   DOES NOT   need reinforcement.

## 2022-08-09 NOTE — PERIOP NOTES
Message left for Tonya Flow at [place] [address] office requesting preoperative orders and H&P to be faxed to [phone].

## 2022-08-10 ENCOUNTER — ANESTHESIA EVENT (OUTPATIENT)
Dept: SURGERY | Age: 37
End: 2022-08-10
Payer: COMMERCIAL

## 2022-08-11 ENCOUNTER — ANESTHESIA (OUTPATIENT)
Dept: SURGERY | Age: 37
End: 2022-08-11
Payer: COMMERCIAL

## 2022-08-11 ENCOUNTER — HOSPITAL ENCOUNTER (OUTPATIENT)
Age: 37
Setting detail: OUTPATIENT SURGERY
Discharge: HOME | End: 2022-08-11
Attending: OBSTETRICS & GYNECOLOGY | Admitting: OBSTETRICS & GYNECOLOGY
Payer: COMMERCIAL

## 2022-08-11 VITALS
TEMPERATURE: 97.7 F | OXYGEN SATURATION: 100 % | DIASTOLIC BLOOD PRESSURE: 85 MMHG | RESPIRATION RATE: 11 BRPM | BODY MASS INDEX: 33.73 KG/M2 | WEIGHT: 209.88 LBS | HEIGHT: 66 IN | HEART RATE: 65 BPM | SYSTOLIC BLOOD PRESSURE: 126 MMHG

## 2022-08-11 DIAGNOSIS — Z98.890 STATUS POST SURGERY: Primary | ICD-10-CM

## 2022-08-11 LAB — HCG UR QL: NEGATIVE

## 2022-08-11 PROCEDURE — 77030002933 HC SUT MCRYL J&J -A: Performed by: OBSTETRICS & GYNECOLOGY

## 2022-08-11 PROCEDURE — 76210000034 HC AMBSU PH I REC 0.5 TO 1 HR: Performed by: OBSTETRICS & GYNECOLOGY

## 2022-08-11 PROCEDURE — 2709999900 HC NON-CHARGEABLE SUPPLY: Performed by: OBSTETRICS & GYNECOLOGY

## 2022-08-11 PROCEDURE — 74011000250 HC RX REV CODE- 250: Performed by: ANESTHESIOLOGY

## 2022-08-11 PROCEDURE — 77030040922 HC BLNKT HYPOTHRM STRY -A

## 2022-08-11 PROCEDURE — 77030040361 HC SLV COMPR DVT MDII -B

## 2022-08-11 PROCEDURE — 76030000000 HC AMB SURG OR TIME 0.5 TO 1: Performed by: OBSTETRICS & GYNECOLOGY

## 2022-08-11 PROCEDURE — 77030020143 HC AIRWY LARYN INTUB CGAS -A: Performed by: ANESTHESIOLOGY

## 2022-08-11 PROCEDURE — 74011000250 HC RX REV CODE- 250: Performed by: OBSTETRICS & GYNECOLOGY

## 2022-08-11 PROCEDURE — 74011250636 HC RX REV CODE- 250/636: Performed by: ANESTHESIOLOGY

## 2022-08-11 PROCEDURE — 81025 URINE PREGNANCY TEST: CPT

## 2022-08-11 PROCEDURE — 77030003666 HC NDL SPINAL BD -A: Performed by: OBSTETRICS & GYNECOLOGY

## 2022-08-11 PROCEDURE — 77030041423 HC SYST FLUID MNGMT FLUENT HOLO -D: Performed by: OBSTETRICS & GYNECOLOGY

## 2022-08-11 PROCEDURE — 76060000061 HC AMB SURG ANES 0.5 TO 1 HR: Performed by: OBSTETRICS & GYNECOLOGY

## 2022-08-11 PROCEDURE — 76210000046 HC AMBSU PH II REC FIRST 0.5 HR: Performed by: OBSTETRICS & GYNECOLOGY

## 2022-08-11 PROCEDURE — 88304 TISSUE EXAM BY PATHOLOGIST: CPT

## 2022-08-11 PROCEDURE — 88305 TISSUE EXAM BY PATHOLOGIST: CPT

## 2022-08-11 RX ORDER — SODIUM CHLORIDE, SODIUM LACTATE, POTASSIUM CHLORIDE, CALCIUM CHLORIDE 600; 310; 30; 20 MG/100ML; MG/100ML; MG/100ML; MG/100ML
125 INJECTION, SOLUTION INTRAVENOUS CONTINUOUS
Status: DISCONTINUED | OUTPATIENT
Start: 2022-08-11 | End: 2022-08-11 | Stop reason: HOSPADM

## 2022-08-11 RX ORDER — NALOXONE HYDROCHLORIDE 0.4 MG/ML
0.2 INJECTION, SOLUTION INTRAMUSCULAR; INTRAVENOUS; SUBCUTANEOUS
Status: DISCONTINUED | OUTPATIENT
Start: 2022-08-11 | End: 2022-08-11 | Stop reason: HOSPADM

## 2022-08-11 RX ORDER — SILVER NITRATE 38.21; 12.74 MG/1; MG/1
STICK TOPICAL AS NEEDED
Status: DISCONTINUED | OUTPATIENT
Start: 2022-08-11 | End: 2022-08-11 | Stop reason: HOSPADM

## 2022-08-11 RX ORDER — OXYCODONE AND ACETAMINOPHEN 5; 325 MG/1; MG/1
1 TABLET ORAL
Qty: 4 TABLET | Refills: 0 | Status: SHIPPED | OUTPATIENT
Start: 2022-08-11 | End: 2022-08-14

## 2022-08-11 RX ORDER — ONDANSETRON 2 MG/ML
INJECTION INTRAMUSCULAR; INTRAVENOUS AS NEEDED
Status: DISCONTINUED | OUTPATIENT
Start: 2022-08-11 | End: 2022-08-11 | Stop reason: HOSPADM

## 2022-08-11 RX ORDER — LIDOCAINE HYDROCHLORIDE 10 MG/ML
0.1 INJECTION, SOLUTION EPIDURAL; INFILTRATION; INTRACAUDAL; PERINEURAL AS NEEDED
Status: DISCONTINUED | OUTPATIENT
Start: 2022-08-11 | End: 2022-08-11 | Stop reason: HOSPADM

## 2022-08-11 RX ORDER — PROPOFOL 10 MG/ML
INJECTION, EMULSION INTRAVENOUS AS NEEDED
Status: DISCONTINUED | OUTPATIENT
Start: 2022-08-11 | End: 2022-08-11 | Stop reason: HOSPADM

## 2022-08-11 RX ORDER — FENTANYL CITRATE 50 UG/ML
INJECTION, SOLUTION INTRAMUSCULAR; INTRAVENOUS AS NEEDED
Status: DISCONTINUED | OUTPATIENT
Start: 2022-08-11 | End: 2022-08-11 | Stop reason: HOSPADM

## 2022-08-11 RX ORDER — LIDOCAINE HYDROCHLORIDE AND EPINEPHRINE 10; 10 MG/ML; UG/ML
INJECTION, SOLUTION INFILTRATION; PERINEURAL AS NEEDED
Status: DISCONTINUED | OUTPATIENT
Start: 2022-08-11 | End: 2022-08-11 | Stop reason: HOSPADM

## 2022-08-11 RX ORDER — FLUMAZENIL 0.1 MG/ML
0.2 INJECTION INTRAVENOUS
Status: DISCONTINUED | OUTPATIENT
Start: 2022-08-11 | End: 2022-08-11 | Stop reason: HOSPADM

## 2022-08-11 RX ORDER — KETOROLAC TROMETHAMINE 30 MG/ML
INJECTION, SOLUTION INTRAMUSCULAR; INTRAVENOUS AS NEEDED
Status: DISCONTINUED | OUTPATIENT
Start: 2022-08-11 | End: 2022-08-11 | Stop reason: HOSPADM

## 2022-08-11 RX ORDER — DEXAMETHASONE SODIUM PHOSPHATE 4 MG/ML
INJECTION, SOLUTION INTRA-ARTICULAR; INTRALESIONAL; INTRAMUSCULAR; INTRAVENOUS; SOFT TISSUE AS NEEDED
Status: DISCONTINUED | OUTPATIENT
Start: 2022-08-11 | End: 2022-08-11 | Stop reason: HOSPADM

## 2022-08-11 RX ORDER — HYDROMORPHONE HYDROCHLORIDE 1 MG/ML
.25-1 INJECTION, SOLUTION INTRAMUSCULAR; INTRAVENOUS; SUBCUTANEOUS
Status: DISCONTINUED | OUTPATIENT
Start: 2022-08-11 | End: 2022-08-11 | Stop reason: HOSPADM

## 2022-08-11 RX ORDER — MIDAZOLAM HYDROCHLORIDE 1 MG/ML
INJECTION, SOLUTION INTRAMUSCULAR; INTRAVENOUS AS NEEDED
Status: DISCONTINUED | OUTPATIENT
Start: 2022-08-11 | End: 2022-08-11 | Stop reason: HOSPADM

## 2022-08-11 RX ORDER — DIPHENHYDRAMINE HYDROCHLORIDE 50 MG/ML
12.5 INJECTION, SOLUTION INTRAMUSCULAR; INTRAVENOUS AS NEEDED
Status: DISCONTINUED | OUTPATIENT
Start: 2022-08-11 | End: 2022-08-11 | Stop reason: HOSPADM

## 2022-08-11 RX ORDER — LIDOCAINE HYDROCHLORIDE 20 MG/ML
INJECTION, SOLUTION EPIDURAL; INFILTRATION; INTRACAUDAL; PERINEURAL AS NEEDED
Status: DISCONTINUED | OUTPATIENT
Start: 2022-08-11 | End: 2022-08-11 | Stop reason: HOSPADM

## 2022-08-11 RX ADMIN — KETOROLAC TROMETHAMINE 30 MG: 30 INJECTION, SOLUTION INTRAMUSCULAR; INTRAVENOUS at 13:53

## 2022-08-11 RX ADMIN — FENTANYL CITRATE 100 MCG: 50 INJECTION, SOLUTION INTRAMUSCULAR; INTRAVENOUS at 13:31

## 2022-08-11 RX ADMIN — DEXAMETHASONE SODIUM PHOSPHATE 4 MG: 4 INJECTION, SOLUTION INTRAMUSCULAR; INTRAVENOUS at 13:39

## 2022-08-11 RX ADMIN — MIDAZOLAM HYDROCHLORIDE 2 MG: 2 INJECTION, SOLUTION INTRAMUSCULAR; INTRAVENOUS at 13:21

## 2022-08-11 RX ADMIN — ONDANSETRON HYDROCHLORIDE 4 MG: 2 SOLUTION INTRAMUSCULAR; INTRAVENOUS at 13:38

## 2022-08-11 RX ADMIN — PROPOFOL 150 MG: 10 INJECTION, EMULSION INTRAVENOUS at 13:31

## 2022-08-11 RX ADMIN — LIDOCAINE HYDROCHLORIDE 60 MG: 20 INJECTION, SOLUTION INTRAVENOUS at 13:31

## 2022-08-11 RX ADMIN — SODIUM CHLORIDE, POTASSIUM CHLORIDE, SODIUM LACTATE AND CALCIUM CHLORIDE 125 ML/HR: 600; 310; 30; 20 INJECTION, SOLUTION INTRAVENOUS at 11:25

## 2022-08-11 NOTE — OP NOTES
[facility]  OPERATIVE REPORT    Name:  Pili Wolf  MR#:  [id number]  :  1985  ACCOUNT #:  [de-identified]  DATE OF SERVICE:  2022    PREOPERATIVE DIAGNOSES:  1. Abnormal uterine bleeding. 2.  Vulvar inclusion cyst.    POSTOPERATIVE DIAGNOSES:  1. Abnormal uterine bleeding. 2.  Vulvar inclusion cyst.    PROCEDURES PERFORMED:  1. Hysteroscopy. 2.  D and C.  3.  Polypectomy. 4.  Excision of vulvar inclusion cyst.    SURGEON:  Zofia Bauer MD    ASSISTANT:  None. ANESTHESIA:  General.    COMPLICATIONS:  None. SPECIMENS REMOVED:  Pathology:  1. Endometrial curettings and polyp. 2.  Right vulvar inclusion cyst.    IMPLANTS:  None. ESTIMATED BLOOD LOSS:  Minimal.    IV FLUIDS:  600 mL    URINE OUTPUT:  None. DRAINS:  None. FINDINGS AT THE TIME OF THE PROCEDURE:  1. Normal tubal ostia bilaterally. 2.  Small polyp in the right cornua. 3.  A 1-cm labial inclusion cyst.  4.  Hemostasis. PROCEDURE:  After informed consent, the patient was taken to the operating room where general anesthesia was found to be adequate. She was then prepped and draped in the normal sterile fashion in the dorsal supine high lithotomy position using the [employer] stirrups. Care was taken to avoid hyperflexion of pressure points and a formal time-out was performed. A speculum was inserted into the vagina and the anterior lip of the cervix was grasped with a single-tooth tenaculum. The cervix was then injected in a paracervical block fashion with lidocaine with epinephrine. The cervix was then gently serially dilated to 7 mm and the hysteroscope was assembled and passed into the uterine cavity. Findings were noted as above. Using [employer] forceps, the polyp was removed first and then a gentle curette was done until a gritty texture was appreciated throughout.   The scope was then used one further time to evaluate the base of the polyp which was a small residual piece which was removed with the curette. Once this was complete, the hysteroscope was removed. The tenaculum was removed off of the anterior lip of the cervix and the puncture sites were made hemostatic with silver nitrate. The speculum was removed. Attention was then turned to the vulva where a 1-cm vulvar inclusion cyst was noted with several submillimeter inclusion cysts next to it. This site was injected with lidocaine with epinephrine and a small incision was made superior to the cyst.  The cyst wall was then shelled out and the cyst was passed to Pathology. The small very tiny cysts were expressed. One suture of 4-0 Monocryl was used in a mattress fashion to close the incision and hemostasis was excellent. Sponge, lap and needle counts were correct at the end of the case. She tolerated the procedure very well and was taken to the recovery room in stable and awake condition.       Julianna Scott MD      [id number]  D:  08/11/2022 14:28  T:  08/11/2022 18:35  JOB #:  [id number]

## 2022-08-11 NOTE — H&P
Gynecology History and Physical    Name: Wild Carney MRN: [id number] SSN: [id number]    YOB: 1985  Age: 44 y.o. Sex: female       Subjective:      Chief complaint:  Abnormal uterine bleeding    Andrea Whittaker is a 44 y.o.  female with a history of abnormal uterine bleeding. Previous workup included Ultrasound which revealed a polyp. Previous treatment measures included none. She is admitted for Procedure(s) (LRB):  EXCISION OF INCLUSION CYST, HYSTEROSCOPY DILATIATION AND CURETTAGE POLYPECTOMY (N/A). The current method of family planning is none. OB History          3    Para   3    Term   3       0    AB   0    Living   1         SAB   0    IAB   0    Ectopic   0    Molar        Multiple   0    Live Births   1              Past Medical History:   Diagnosis Date    Anxiety     Asthma     as a child    COVID-19 2022    HX OTHER MEDICAL     GERD    Obesity     Psychiatric disorder     Sickle-cell disease, unspecified     trait     Past Surgical History:   Procedure Laterality Date    HX CARPAL TUNNEL RELEASE Left     HX CYST INCISION AND DRAINAGE      LABIA     Social History     Occupational History    Not on file   Tobacco Use    Smoking status: Never    Smokeless tobacco: Never   Substance and Sexual Activity    Alcohol use: Yes     Comment: Socially    Drug use: No    Sexual activity: Yes     Partners: Male     Birth control/protection: None     History reviewed. No pertinent family history. Allergies   Allergen Reactions    Iodine Swelling    Keflex [Cephalexin] Hives     Prior to Admission medications    Medication Sig Start Date End Date Taking? Authorizing Provider   cholecalciferol, vitamin D3, (VITAMIN D3 PO) Take 500 mg by mouth daily. Yes Provider, Historical   pseudoephedrine (SUDAFED) 30 mg tablet Take  by mouth. 1 - 2x A DAY   Yes Provider, Historical   omeprazole (PRILOSEC OTC) 20 mg tablet Take 20 mg by mouth in the morning.    Yes Provider, Historical   naproxen sodium 220 mg cap Take  by mouth as needed. Yes Provider, Historical   diazePAM (VALIUM) 2 mg tablet Take 2 mg by mouth nightly as needed for Anxiety. Yes Niki, MD Nikko   fluticasone propionate (FLONASE) 50 mcg/actuation nasal spray 1 [place] by Nasal route. Yes Niki, MD Nikko        Review of Systems:  A comprehensive review of systems was negative except for that written in the History of Present Illness. Objective:     Vitals:    08/11/22 1101 08/11/22 1116   BP:  (!) 143/84   Resp:  18   Temp:  98.2 °F (36.8 °C)   SpO2:  98%   Weight: 95.2 kg (209 lb 14.1 oz)    Height: 5' 6\" (1.676 m)        Physical Exam:  Heart: Regular rate and rhythm  Lung: clear to auscultation throughout lung fields, no wheezes, no rales, no rhonchi, and normal respiratory effort    Assessment:       Abnormal uterine bleeding with suspected endometrial polyp    Plan:     Procedure(s) (LRB):  EXCISION OF INCLUSION CYST, HYSTEROSCOPY DILATIATION AND CURETTAGE POLYPECTOMY (N/A)  Discussed the risks of surgery including the risks of bleeding, infection, deep vein thrombosis, and surgical injuries to internal organs including but not limited to the bowels, bladder, rectum, and female reproductive organs. The patient understands the risks; any and all questions were answered to the patient's satisfaction.     Karen Beltran MD  [facility]

## 2022-08-11 NOTE — ANESTHESIA POSTPROCEDURE EVALUATION
Procedure(s):  EXCISION OF INCLUSION CYST, HYSTEROSCOPY DILATIATION AND CURETTAGE POLYPECTOMY. general    Anesthesia Post Evaluation      Multimodal analgesia: multimodal analgesia not used between 6 hours prior to anesthesia start to PACU discharge  Patient location during evaluation: PACU  Patient participation: complete - patient participated  Level of consciousness: awake  Pain management: adequate  Airway patency: patent  Anesthetic complications: no  Cardiovascular status: acceptable, blood pressure returned to baseline and hemodynamically stable  Respiratory status: acceptable  Hydration status: acceptable  Post anesthesia nausea and vomiting:  controlled  Final Post Anesthesia Temperature Assessment:  Normothermia (36.0-37.5 degrees C)      INITIAL Post-op Vital signs:   Vitals Value Taken Time   /85 08/11/22 1510   Temp 36.5 °C (97.7 °F) 08/11/22 1412   Pulse 60 08/11/22 1511   Resp 11 08/11/22 1511   SpO2 100 % 08/11/22 1512   Vitals shown include unvalidated device data.

## 2022-08-11 NOTE — BRIEF OP NOTE
Brief Postoperative Note    Patient: Natalia Nogueira  YOB: 1985  MRN: [id number]    Date of Procedure: 8/11/2022     Pre-Op Diagnosis: LABIAL INCLUSION CYST, ABNORMAL UTERINE BLEEDING  POLYPS    Post-Op Diagnosis: Same as preoperative diagnosis. Procedure(s):  EXCISION OF INCLUSION CYST, HYSTEROSCOPY DILATIATION AND CURETTAGE POLYPECTOMY    Surgeon(s):  Rigo Duggan MD    Surgical Assistant: None    Anesthesia: General     Estimated Blood Loss (mL): Minimal    Complications: None    Specimens:   ID Type Source Tests Collected by Time Destination   1 : Endometrial Curettings and Polyp Preservative Uterus  Rigo Duggan MD 8/11/2022 1347 Pathology   2 : Right Labial Inclusion Cyst Preservative Josef Sexton MD 8/11/2022 1351 Pathology        Implants: * No implants in log *    Drains: * No LDAs found *    Findings: 1cm inclusion cyst of the right labia majora, small fundal polyp near the right tubal ostia. Normal ostia.   Hemostasis     Electronically Signed by Isabel Ramos MD on 8/11/2022 at 2:06 PM

## 2022-08-11 NOTE — ANESTHESIA PREPROCEDURE EVALUATION
Relevant Problems   No relevant active problems       Anesthetic History   No history of anesthetic complications            Review of Systems / Medical History  Patient summary reviewed and pertinent labs reviewed    Pulmonary        Sleep apnea    Asthma        Neuro/Psych         Psychiatric history     Cardiovascular                  Exercise tolerance: >4 METS     GI/Hepatic/Renal     GERD           Endo/Other        Morbid obesity     Other Findings              Physical Exam    Airway  Mallampati: I  TM Distance: 4 - 6 cm  Neck ROM: normal range of motion   Mouth opening: Normal     Cardiovascular    Rhythm: regular  Rate: normal         Dental    Dentition: Lower dentition intact and Upper dentition intact     Pulmonary  Breath sounds clear to auscultation               Abdominal  GI exam deferred       Other Findings            Anesthetic Plan    ASA: 2  Anesthesia type: general          Induction: Intravenous  Anesthetic plan and risks discussed with: Patient

## 2022-08-11 NOTE — DISCHARGE INSTRUCTIONS
Discharge Instructions for outpatient GYN surgery    Patient ID:  Jocelyn Lilly  [id number]  44 y.o.  1985    Take Home Medications       Follow-up with Dr Polly Lainez in 2-4 weeks, call office for appointment, [phone]    Follow-up care is a key part of your treatment and safety. Be sure to schedule and keep all recommended follow up appointments    What to Expect at [address] [facility] might feel a bit sleepy, groggy or nauseous today because of the anesthetic or pain medication you received. Do not drive today. These symptoms should resolve by tomorrow. You will probably feel some cramping over the next day or two- this is normal.  You may take an over-the-counter pain medication such as Tylenol, Aleve, Motrin, Advil (ibuprofen) or you may have been given a prescription pain medication. Try to limit use of prescription pain medication to just a day or two, as they can cause constipation. You will probably experience some light vaginal bleeding or spotting. This is normal.     How can you care for yourself at home? Activity  Pelvic rest for 2-4 weeks (nothing in your vagina such as tampons, intercourse or douching)  You man return to work and normal activities tomorrow or the next day. If you are feeling well, you can also resume exercise. If you are having pain or not feeling well, you should wait a few days before exercising. Diet  Regular diet as tolerated  Drink plenty of fluids    Medicines  Take pain medicines as directed by your doctor. If you a prescription for pain medicine, take as needed . If you are not taking a prescription pain medicine, take an over-the-counter medicine such as acetaminophen (Tylenol), ibuprofen (Advil, Motrin), or naproxen (Aleve). Read and follow all instructions on the label. Do not take two or more pain medicines at the same time unless the doctor told you to. Many pain medicines contain acetaminophen, which is Tylenol.  Too much Tylenol can be harmful. If your doctor prescribed antibiotics, take them as directed. Do not stop taking them just because you feel better. If you think your pain medicine is making your stomach upset:  Take your medicine after meals (unless your doctor tells you not to). Ask your doctor for a different pain medicine. Call your doctor  or seek immediate medical care if you have:  bright red vaginal bleeding that soaks one or more pads in an hour, or you have large clots. foul-smelling discharge from your vagina. persistent nausea and vomiting  pain that does not get better after you take pain medicine. signs of infection, such as: Increased pain, swelling, warmth, or redness. A persistent fever of 101.5 F  signs of a blood clot, such as:  Pain, redness or swelling in your lower extremeties  You have trouble passing urine or stool, especially if you have pain or swelling in your lower belly. hot flashes, sweating, flushing, or a fast or pounding heartbeat.:  no bowel movement after taking a laxative. loss of consciousness  sudden chest pain and shortness of breath, or you cough up blood. persistent abdominal pain despite taking pain medication  DISCHARGE SUMMARY from your Nurse      PATIENT INSTRUCTIONS    After general anesthesia or intravenous sedation, for 24 hours or while taking prescription Narcotics:  Limit your activities  Do not drive and operate hazardous machinery  Do not make important personal or business decisions  Do  not drink alcoholic beverages  If you have not urinated within 8 hours after discharge, please contact your surgeon on call.     Report the following to your surgeon:  Excessive pain, swelling, redness or odor of or around the surgical area  Temperature over 100.5  Nausea and vomiting lasting longer than 4 hours or if unable to take medications  Any signs of decreased circulation or nerve impairment to extremity: change in color, persistent  numbness, tingling, coldness or increase pain  Any questions      GOOD HELP TO FIGHT AN INFECTION  Here are a few tip to help reduce the chance of getting an infection after surgery:  Wash Your Hands  Good handwashing is the most important thing you and your caregiver can do. Wash before and after caring for any wounds. Dry your hand with a clean towel. Wash with soap and water for at least 20 seconds. A TIP: sing the \"Happy Birthday\" song through one time while washing to help with the timing. Use a hand  in between washings. Shower  When your surgeon says it is OK to take a shower, use a new bar of antibacterial soap (if that is what you use, and keep that bar of soap ONLY for your use), or antibacterial body wash. Use a clean wash cloth or sponge when you bathe. Dry off with a clean towel  after every bath - be careful around any wounds, skin staples, sutures or surgical glue over/on wounds. Do not enter swimming pools, hot tubs, lakes, rivers and/or ocean until wounds are healed and your doctor/surgeon says it is OK. Use Clean Sheets  Sleep on freshly laundered sheets after your surgery. Keep the surgery site covered with a clean, dry bandage (if instructed to do so). If the bandage becomes soiled, reapply a new, dry, clean bandage. Do not allow pets to sleep with you while your wound is healing. Lifestyle Modification and Controlling Your Blood Sugar  Smoking slows wound healing. Stop smoking and limit exposure to second-hand smoke. High blood sugar slows wound healing. Eat a well-balanced diet to provide proper nutrition while healing  Monitor your blood sugar (if you are a diabetic) and take your medications as you are suppose to so you can control you blood sugar after surgery. COUGH AND DEEP BREATHE    Breathing deeply and coughing are very important exercises to do after surgery. Deep breathing and coughing open the little air tubes and air sacks in your lungs. You take deep breaths every day.   You may not even notice - it is just something you do when you sigh or yawn. It is a natural exercise you do to keep these air passages open. After surgery, take deep breaths and cough, on purpose. DIRECTIONS:  Take 10 to 15 slow deep breaths every hour while awake. Breathe in deeply, and hold it for 2 seconds. Exhale slowly through puckered lips, like blowing up a balloon. After every 4th or 5th deep breath, hug your pillow to your chest or belly and give a hard, deep cough. Yes, it will probably hurt. But doing this exercise is a very important part of healing after surgery. Take your pain medicine to help you do this exercise without too much pain. Coughing and deep breathing help prevent bronchitis and pneumonia after surgery. If you had chest or belly surgery, use a pillow as a \"hug jewel\" and hold it tightly to your chest or belly when you cough. ANKLE PUMPS    Ankle pumps increase the circulation of oxygenated blood to your lower extremities and decrease your risk for circulation problems such as blood clots. They also stretch the muscles, tendons and ligaments in your foot and ankle, and prevent joint contracture in the ankle and foot, especially after surgeries on the legs. It is important to do ankle pump exercises regularly after surgery because immobility increases your risk for developing a blood clot. Your doctor may also have you take an Aspirin for the next few days as well. If your doctor did not ask you to take an Aspirin, consult with him before starting Aspirin therapy on your own. The exercise is quite simple. Slowly point your foot forward, feeling the muscles on the top of your lower leg stretch, and hold this position for 5 seconds. Next, pull your foot back toward you as far as possible, stretching the calf muscles, and hold that position for 5 seconds. Repeat with the other foot.   Perform 10 repetitions every hour while awake for both ankles if possible (down and then up with the foot once is one repetition). You should feel gentle stretching of the muscles in your lower leg when doing this exercise. If you feel pain, or your range of motion is limited, don't push too hard. Only go the limit your joint and muscles will let you go. If you have increasing pain, progressively worsening leg warmth or swelling, STOP the exercise and call your doctor. MEDICATION AND   SIDE EFFECT GUIDE    The [facility] MEDICATION AND SIDE EFFECT GUIDE was provided to the PATIENT AND CARE PROVIDER. Information provided includes instruction about drug purpose and common side effects for the following medications:   OXYCODONE        These are general instructions for a healthy lifestyle:    *   Please give a list of your current medications to your Primary Care Provider. *   Please update this list whenever your medications are discontinued, doses are changed, or new medications (including over-the-counter products) are added. *   Please carry medication information at all times in case of emergency situations. About Smoking  No smoking / No tobacco products  Avoid exposure to second hand smoke     Surgeon General's Warning:  Quitting smoking now greatly reduces serious risk to your health. Obesity, smoking, and sedentary lifestyle greatly increases your risk for illness and disease. A healthy diet, regular physical exercise & weight monitoring are important for maintaining a healthy lifestyle. Congestive Heart Failure  You may be retaining fluid if you have a history of heart failure or if you experience any of the following symptoms:  Weight gain of 3 pounds or more overnight or 5 pounds in a week, increased swelling in your hands or feet or shortness of breath while lying flat in bed. Please call your doctor as soon as you notice any of these symptoms; do not wait until your next office visit.       Recognize signs and symptoms of STROKE:  F -  Face looks uneven  A -  Arms unable to move or move evenly  S -  Speech slurred or non-existent  T -  Time-call 911 as soon as signs and symptoms begin-DO NOT go          back to bed or wait to see if you get better-TIME IS BRAIN. Warning Signs of HEART ATTACK   Call 911 if you have these symptoms:    Chest discomfort. Most heart attacks involve discomfort in the center of the chest that lasts more than a few minutes, or that goes away and comes back. It can feel like uncomfortable pressure, squeezing, fullness, or pain. Discomfort in other areas of the upper body. Symptoms can include pain or discomfort in one or both arms, the back, neck, jaw, or stomach. Shortness of breath with or without chest discomfort. Other signs may include breaking out in a cold sweat, nausea, or lightheadedness. Don't wait more than five minutes to call 911 - MINUTES MATTER! Fast action can save your life. Calling 911 is almost always the fastest way to get lifesaving treatment. Emergency Medical Services staff can begin treatment when they arrive -- up to an hour sooner than if someone gets to the hospital by car. Learning About Coronavirus [phone])  Coronavirus [phone]): Overview  What is coronavirus (COVID-19)? The coronavirus disease (COVID-19) is caused by a virus. It is an illness that was first found in [place], [place], in December 2019. It has since spread worldwide. The virus can cause fever, cough, and trouble breathing. In severe cases, it can cause pneumonia and make it hard to breathe without help. It can cause death. Coronaviruses are a large group of viruses. They cause the common cold. They also cause more serious illnesses like Middle East respiratory syndrome (MERS) and severe acute respiratory syndrome (SARS). COVID-19 is caused by a novel coronavirus. That means it's a new type that has not been seen in people before.   This virus spreads person-to-person through droplets from coughing and sneezing. It can also spread when you are close to someone who is infected. And it can spread when you touch something that has the virus on it, such as a doorknob or a tabletop. What can you do to protect yourself from coronavirus (COVID-19)? The best way to protect yourself from getting sick is to: Avoid areas where there is an outbreak. Avoid contact with people who may be infected. Wash your hands often with soap or alcohol-based hand sanitizers. Avoid crowds and try to stay at least 6 feet away from other people. Wash your hands often, especially after you cough or sneeze. Use soap and water, and scrub for at least 20 seconds. If soap and water aren't available, use an alcohol-based hand . Avoid touching your mouth, nose, and eyes. What can you do to avoid spreading the virus to others? To help avoid spreading the virus to others:  Cover your mouth with a tissue when you cough or sneeze. Then throw the tissue in the trash. Use a disinfectant to clean things that you touch often. Stay home if you are sick or have been exposed to the virus. Don't go to school, work, or public areas. And don't use public transportation. If you are sick:  Leave your home only if you need to get medical care. But call the doctor's office first so they know you're coming. And wear a face mask, if you have one. If you have a face mask, wear it whenever you're around other people. It can help stop the spread of the virus when you cough or sneeze. Clean and disinfect your home every day. Use household  and disinfectant wipes or sprays. Take special care to clean things that you grab with your hands. These include doorknobs, remote controls, phones, and handles on your refrigerator and microwave. And don't forget countertops, tabletops, bathrooms, and computer keyboards. When to call for help  Call 911 anytime you think you may need emergency care.  For example, call if:  You have severe trouble breathing. (You can't talk at all.)  You have constant chest pain or pressure. You are severely dizzy or lightheaded. You are confused or can't think clearly. Your face and lips have a blue color. You pass out (lose consciousness) or are very hard to wake up. Call your doctor now if you develop symptoms such as:  Shortness of breath. Fever. Cough. If you need to get care, call ahead to the doctor's office for instructions before you go. Make sure you wear a face mask, if you have one, to prevent exposing other people to the virus. Where can you get the latest information? The following health organizations are tracking and studying this virus. Their websites contain the most up-to-date information. Ousmane Bennett also learn what to do if you think you may have been exposed to the virus. U.S. Centers for Disease Control and Prevention (CDC): The CDC provides updated news about the disease and travel advice. The website also tells you how to prevent the spread of infection. www.cdc.gov  World Health Organization [facility]): WHO offers information about the virus outbreaks. WHO also has travel advice. www.who.int  Current as of: April 1, 2020               Content Version: 12.4  © 2006-2020 Health[employer] Incorporated. Care instructions adapted under license by your healthcare professional. If you have questions about a medical condition or this instruction, always ask your healthcare professional. [address] any warranty or liability for your use of this information. The discharge information has been reviewed with the caregiver. Any questions and concerns from the caregiver have been addressed. The caregiver verbalized understanding.         CONTENTS FOUND IN YOUR DISCHARGE ENVELOPE:  [x]     Surgeon and Hospital Discharge Instructions  [x]     [facility] Care Provider Card  []     Medication & Side Effect Guide            (your newly prescribed medications have been marked/highlighted showing the most common side effects from   the classes of drugs on your prescriptions)  [x]     Medication Prescription(s) x 1 ( [x] These have been sent electronically to your pharmacy by your surgeon,   - OR -       your surgeon has already provided these to you during a previous/pre-op office visit)  []     [address]  []     Physical Therapy Prescription  []     Follow-up Appointment Cards  []     Surgery-related Pictures/Media  []     Pain block and/or block with On-Q Catheter from Anesthesia Service (information included in your instructions above)  []     Medical device information sheets/pamphlets from their    []     School/work excuse note. []     /parent work excuse note. The following personal items collected during your admission are returned to you:   Dental Appliance: Dental Appliances: None  Vision: Visual Aid: None  Hearing Aid:    Jewelry: Jewelry: None  Clothing: Clothing: Shirt, Pants, Undergarments, Sweater, Footwear  Other Valuables:  Other Valuables: Cell Phone  Valuables sent to safe:

## 2022-09-12 ENCOUNTER — OFFICE VISIT (OUTPATIENT)
Dept: ENDOCRINOLOGY | Age: 37
End: 2022-09-12
Payer: COMMERCIAL

## 2022-09-12 VITALS
WEIGHT: 198 LBS | SYSTOLIC BLOOD PRESSURE: 106 MMHG | OXYGEN SATURATION: 98 % | DIASTOLIC BLOOD PRESSURE: 73 MMHG | HEART RATE: 73 BPM | BODY MASS INDEX: 37.38 KG/M2 | HEIGHT: 61 IN

## 2022-09-12 DIAGNOSIS — E04.9 GOITER: ICD-10-CM

## 2022-09-12 DIAGNOSIS — E28.2 PCOS (POLYCYSTIC OVARIAN SYNDROME): Primary | ICD-10-CM

## 2022-09-12 DIAGNOSIS — E66.9 OBESITY (BMI 30-39.9): ICD-10-CM

## 2022-09-12 PROCEDURE — 99213 OFFICE O/P EST LOW 20 MIN: CPT | Performed by: INTERNAL MEDICINE

## 2022-09-12 RX ORDER — METFORMIN HYDROCHLORIDE 500 MG/1
TABLET, EXTENDED RELEASE ORAL
Qty: 120 TABLET | Refills: 2 | Status: SHIPPED | OUTPATIENT
Start: 2022-09-12 | End: 2022-10-21

## 2022-09-12 NOTE — PROGRESS NOTES
9/12/2022    Assessment:       Diagnosis Orders   1. PCOS (polycystic ovarian syndrome)  metFORMIN (GLUCOPHAGE-XR) 500 MG extended release tablet    Basic Metabolic Panel      2. Obesity (BMI 30-39.9)        3. Goiter  T4, Free    TSH    US HEAD NECK SOFT TISSUE THYROID            PLAN:     Orders Placed This Encounter   Procedures    US HEAD NECK SOFT TISSUE THYROID     This procedure can be scheduled via [facility]. Access your [facility] account by visiting [email or web address]. Standing Status:   Future     Standing Expiration Date:   5/02/1444    Basic Metabolic Panel     Standing Status:   Future     Standing Expiration Date:   9/12/2023    T4, Free     Standing Status:   Future     Standing Expiration Date:   9/12/2023    TSH     Standing Status:   Future     Standing Expiration Date:   9/12/2023     Orders Placed This Encounter   Medications    metFORMIN (GLUCOPHAGE-XR) 500 MG extended release tablet     Sig: TAKE 2 TABLETS BY MOUTH TWICE A DAY     Dispense:  120 tablet     Refill:  2       Subjective:     Chief Complaint   Patient presents with    Hypothyroidism     Labs results    Other     Pcos      Vitals:    09/12/22 0957   BP: 106/73   Pulse: 73   SpO2: 98%   Weight: 198 lb (89.8 kg)   Height: 5' 1\" (1.549 m)     Wt Readings from Last 3 Encounters:   09/12/22 198 lb (89.8 kg)   08/03/22 199 lb (90.3 kg)   05/04/22 207 lb (93.9 kg)     BP Readings from Last 3 Encounters:   09/12/22 106/73   08/03/22 130/82   05/04/22 (!) 128/94     Follow-up on obesity polycystic ovary syndrome history of Hashimoto thyroiditis goiter A1c was normal done recently thyroid function test also normal patient wants refill of metformin BMI is 37 ultrasound was done end of last year    Other  This is a chronic (Polycystic ovary syndrome) problem. The current episode started more than 1 year ago. The problem occurs intermittently. The problem has been waxing and waning. Pertinent negatives include no neck pain.  Exacerbated by: Insulin resistance. Treatments tried: Metformin. The treatment provided moderate relief. EXAMINATION: US HEAD NECK SOFT TISSUE THYROID       CLINICAL HISTORY: GOITER. FINDINGS:        Right lobe measures 4.7 x 1.6 x 1.6 cm. Left lobe measures 4.3 x 1.3 x 1.4 cm. Isthmus measures 3.1 mm. Right lobe demonstrates no discrete cystic or solid lesions. In the left lobe, posteriorly in the lower pole, a 4.8 x 3.0 x 4.6 mm nearly completely solid, isoechoic, wider than tall, ill-defined, noncalcified nodule is identified. Impression       CATEGORY 3: MILDLY SUSPICIOUS. NO BIOPSY REQUIRED. FOLLOW-UP IMAGING IF GREATER THAN OR EQUAL TO 1.5 CM. LEFT LOBE, LOWER POLE. Past Medical History:   Diagnosis Date    Hyperthyroidism     PCOS (polycystic ovarian syndrome)     Prediabetes      No past surgical history on file. Social History     Socioeconomic History    Marital status:      Spouse name: Not on file    Number of children: Not on file    Years of education: Not on file    Highest education level: Not on file   Occupational History    Not on file   Tobacco Use    Smoking status: Never    Smokeless tobacco: Never   Vaping Use    Vaping Use: Never used   Substance and Sexual Activity    Alcohol use: Never    Drug use: Never    Sexual activity: Yes   Other Topics Concern    Not on file   Social History Narrative    Not on file     Social Determinants of Health     Financial Resource Strain: Low Risk     Difficulty of Paying Living Expenses: Not hard at all   Food Insecurity: No Food Insecurity    Worried About Running Out of Food in the Last Year: Never true    Ran Out of Food in the Last Year: Never true   Transportation Needs: Not on file   Physical Activity: Not on file   Stress: Not on file   Social Connections: Not on file   Intimate Partner Violence: Not on file   Housing Stability: Not on file     No family history on file.   Allergies   Allergen Reactions Gluten Headaches and Swelling       Current Outpatient Medications:     metFORMIN (GLUCOPHAGE-XR) 500 MG extended release tablet, TAKE 2 TABLETS BY MOUTH TWICE A DAY, Disp: 90 tablet, Rfl: 2    Wheat Dextrin (BENEFIBER) POWD, Take 4 g by mouth in the morning and 4 g at noon and 4 g in the evening. Take with meals. , Disp: 500 g, Rfl: 0    Multiple Vitamin (MULTIVITAMIN PO), Take by mouth, Disp: , Rfl:     Bisacodyl (LAXATIVE PO), Take by mouth OTC, Disp: , Rfl:   Lab Results   Component Value Date     09/29/2021    K 4.3 09/29/2021     09/29/2021    CO2 27 09/29/2021    BUN 10 09/29/2021    CREATININE 0.67 09/29/2021    GLUCOSE 84 09/29/2021    CALCIUM 9.3 09/29/2021    LABGLOM >60.0 09/29/2021    GFRAA >60.0 09/29/2021     Lab Results   Component Value Date    WBC 5.5 11/19/2021    HGB 13.0 11/19/2021    HCT 38.0 11/19/2021    MCV 88 11/19/2021     11/19/2021     Lab Results   Component Value Date    LABA1C 4.9 08/03/2022    LABA1C 3.5 09/29/2021     Lab Results   Component Value Date    CHOLFAST 200 11/19/2021    TRIGLYCFAST 50 11/19/2021    HDL 65 11/19/2021    LDLCALC 125 11/19/2021     No results found for: TESTM  Lab Results   Component Value Date    TSHREFLEX 1.780 10/29/2021    T4FREE 1.15 10/29/2021     Lab Results   Component Value Date    TPOABS 205.0 (H) 09/29/2021       Review of Systems   HENT:  Negative for trouble swallowing. Endocrine: Negative. Musculoskeletal:  Negative for neck pain. All other systems reviewed and are negative. Objective:   Physical Exam  Vitals reviewed. Constitutional:       General: She is not in acute distress. Appearance: Normal appearance. She is obese. HENT:      Head: Normocephalic and atraumatic. Right Ear: External ear normal.      Left Ear: External ear normal.      Nose: Nose normal.   Eyes:      General: No scleral icterus. Right eye: No discharge. Left eye: No discharge.       Extraocular Movements: Extraocular movements intact. Conjunctiva/sclera: Conjunctivae normal.   Cardiovascular:      Rate and Rhythm: Normal rate. Pulmonary:      Effort: Pulmonary effort is normal.   Musculoskeletal:         General: Normal range of motion. Cervical back: Normal range of motion and neck supple. Neurological:      General: No focal deficit present. Mental Status: She is alert and oriented to person, place, and time.    Psychiatric:         Mood and Affect: Mood normal.         Behavior: Behavior normal.

## 2022-09-16 ASSESSMENT — ENCOUNTER SYMPTOMS: TROUBLE SWALLOWING: 0

## 2022-09-27 ENCOUNTER — HOSPITAL ENCOUNTER (OUTPATIENT)
Age: 37
Discharge: HOME | End: 2022-09-27
Payer: COMMERCIAL

## 2022-09-27 ENCOUNTER — HOSPITAL ENCOUNTER (OUTPATIENT)
Dept: ULTRASOUND IMAGING | Age: 37
Discharge: HOME | End: 2022-09-29
Payer: COMMERCIAL

## 2022-09-27 DIAGNOSIS — E28.2 PCOS (POLYCYSTIC OVARIAN SYNDROME): ICD-10-CM

## 2022-09-27 DIAGNOSIS — E04.9 GOITER: ICD-10-CM

## 2022-09-27 LAB
ANION GAP SERPL CALCULATED.3IONS-SCNC: 12 MMOL/L (ref 9–17)
BUN BLDV-MCNC: 9 MG/DL (ref 6–20)
CALCIUM SERPL-MCNC: 9 MG/DL (ref 8.6–10.4)
CHLORIDE BLD-SCNC: 102 MMOL/L (ref 98–107)
CO2: 23 MMOL/L (ref 20–31)
CREAT SERPL-MCNC: 0.63 MG/DL (ref 0.5–0.9)
GFR AFRICAN AMERICAN: >60 ML/MIN
GFR NON-AFRICAN AMERICAN: >60 ML/MIN
GFR SERPL CREATININE-BSD FRML MDRD: NORMAL ML/MIN/{1.73_M2}
GLUCOSE BLD-MCNC: 75 MG/DL (ref 70–99)
POTASSIUM SERPL-SCNC: 3.9 MMOL/L (ref 3.7–5.3)
SODIUM BLD-SCNC: 137 MMOL/L (ref 135–144)

## 2022-09-27 PROCEDURE — 80048 BASIC METABOLIC PNL TOTAL CA: CPT

## 2022-09-27 PROCEDURE — 76536 US EXAM OF HEAD AND NECK: CPT

## 2022-09-27 PROCEDURE — 36415 COLL VENOUS BLD VENIPUNCTURE: CPT

## 2022-10-21 DIAGNOSIS — E28.2 PCOS (POLYCYSTIC OVARIAN SYNDROME): ICD-10-CM

## 2022-10-21 RX ORDER — METFORMIN HYDROCHLORIDE 500 MG/1
TABLET, EXTENDED RELEASE ORAL
Qty: 120 TABLET | Refills: 3 | Status: SHIPPED | OUTPATIENT
Start: 2022-10-21

## 2023-02-17 ENCOUNTER — HOSPITAL ENCOUNTER (OUTPATIENT)
Age: 38
Discharge: HOME | End: 2023-02-17
Payer: COMMERCIAL

## 2023-02-17 DIAGNOSIS — E04.9 GOITER: ICD-10-CM

## 2023-02-17 LAB
T4 FREE SERPL-MCNC: 1.56 NG/DL (ref 0.93–1.7)
TSH SERPL-ACNC: 0.03 UIU/ML (ref 0.3–5)

## 2023-02-17 PROCEDURE — 84443 ASSAY THYROID STIM HORMONE: CPT

## 2023-02-17 PROCEDURE — 84439 ASSAY OF FREE THYROXINE: CPT

## 2023-02-17 PROCEDURE — 36415 COLL VENOUS BLD VENIPUNCTURE: CPT

## 2023-02-23 DIAGNOSIS — E28.2 PCOS (POLYCYSTIC OVARIAN SYNDROME): ICD-10-CM

## 2023-02-23 RX ORDER — METFORMIN HYDROCHLORIDE 500 MG/1
TABLET, EXTENDED RELEASE ORAL
Qty: 120 TABLET | Refills: 3 | Status: SHIPPED | OUTPATIENT
Start: 2023-02-23

## 2023-03-13 ENCOUNTER — OFFICE VISIT (OUTPATIENT)
Dept: ENDOCRINOLOGY | Age: 38
End: 2023-03-13
Payer: COMMERCIAL

## 2023-03-13 VITALS
HEART RATE: 71 BPM | SYSTOLIC BLOOD PRESSURE: 112 MMHG | WEIGHT: 190 LBS | OXYGEN SATURATION: 98 % | DIASTOLIC BLOOD PRESSURE: 73 MMHG | BODY MASS INDEX: 35.87 KG/M2 | HEIGHT: 61 IN

## 2023-03-13 DIAGNOSIS — E28.2 PCOS (POLYCYSTIC OVARIAN SYNDROME): Primary | ICD-10-CM

## 2023-03-13 DIAGNOSIS — E06.3 HYPOTHYROIDISM DUE TO HASHIMOTO'S THYROIDITIS: ICD-10-CM

## 2023-03-13 DIAGNOSIS — E03.8 HYPOTHYROIDISM DUE TO HASHIMOTO'S THYROIDITIS: ICD-10-CM

## 2023-03-13 PROCEDURE — 99213 OFFICE O/P EST LOW 20 MIN: CPT | Performed by: INTERNAL MEDICINE

## 2023-03-13 RX ORDER — LEVOTHYROXINE SODIUM 0.03 MG/1
12.5 TABLET ORAL DAILY
Qty: 30 TABLET | Refills: 0 | Status: CANCELLED | OUTPATIENT
Start: 2023-03-13 | End: 2023-05-12

## 2023-03-13 NOTE — PROGRESS NOTES
3/13/2023    Assessment:       Diagnosis Orders   1. PCOS (polycystic ovarian syndrome)        2. Hypothyroidism due to Hashimoto's thyroiditis  T4, Free    TSH    Basic Metabolic Panel          PLAN:     Discontinue Synthroid repeat labs in few months  Continue metformin  Orders Placed This Encounter   Procedures    T4, Free     Standing Status:   Future     Standing Expiration Date:   3/13/2024    TSH     Standing Status:   Future     Standing Expiration Date:   7/40/9912    Basic Metabolic Panel     Standing Status:   Future     Standing Expiration Date:   3/13/2024       Subjective:     Chief Complaint   Patient presents with    Hypothyroidism     Test results    Other     PCOS,      Vitals:    03/13/23 0945   BP: 112/73   Pulse: 71   SpO2: 98%   Weight: 190 lb (86.2 kg)   Height: 5' 1\" (1.549 m)     Wt Readings from Last 3 Encounters:   03/13/23 190 lb (86.2 kg)   02/28/23 188 lb (85.3 kg)   11/03/22 197 lb (89.4 kg)     BP Readings from Last 3 Encounters:   03/13/23 112/73   02/28/23 118/80   11/03/22 112/80     Follow-up on hypothyroidism Hashimoto thyroiditis patient started on low-dose of levothyroxine just a week ago TSH done recently was suppressed also history of polycystic ovary syndrome obesity on metformin    Other  This is a chronic (PCOS) problem. The current episode started more than 1 year ago. The problem has been unchanged. Treatments tried: Metformin. The treatment provided moderate relief. Past Medical History:   Diagnosis Date    Hyperthyroidism     PCOS (polycystic ovarian syndrome)     Prediabetes      No past surgical history on file.   Social History     Socioeconomic History    Marital status:      Spouse name: Not on file    Number of children: Not on file    Years of education: Not on file    Highest education level: Not on file   Occupational History    Not on file   Tobacco Use    Smoking status: Never    Smokeless tobacco: Never   Vaping Use    Vaping Use: Never used Substance and Sexual Activity    Alcohol use: Never    Drug use: Never    Sexual activity: Yes   Other Topics Concern    Not on file   Social History Narrative    Not on file     Social Determinants of Health     Financial Resource Strain: Low Risk     Difficulty of Paying Living Expenses: Not hard at all   Food Insecurity: No Food Insecurity    Worried About [address] in the Last Year: Never true    [address] in the Last Year: Never true   Transportation Needs: Unknown    Lack of Transportation (Medical): Not on file    Lack of Transportation (Non-Medical): No   Physical Activity: Not on file   Stress: Not on file   Social Connections: Not on file   Intimate Partner Violence: Not on file   Housing Stability: Unknown    Unable to Pay for Housing in the Last Year: Not on file    Number of Places Lived in the Last Year: Not on file    Unstable Housing in the Last Year: No     No family history on file.   Allergies   Allergen Reactions    Gluten Headaches and Swelling       Current Outpatient Medications:     Wheat Dextrin (BENEFIBER) POWD, Take 4 g by mouth 3 times daily (with meals), Disp: , Rfl:     levothyroxine (SYNTHROID) 25 MCG tablet, Take 0.5 tablets by mouth daily, Disp: 30 tablet, Rfl: 0    metFORMIN (GLUCOPHAGE-XR) 500 MG extended release tablet, TAKE 2 TABLETS BY MOUTH TWICE A DAY, Disp: 120 tablet, Rfl: 3    Multiple Vitamin (MULTIVITAMIN PO), Take by mouth, Disp: , Rfl:     Bisacodyl (LAXATIVE PO), Take by mouth OTC, Disp: , Rfl:   Lab Results   Component Value Date     09/27/2022    K 3.9 09/27/2022     09/27/2022    CO2 23 09/27/2022    BUN 9 09/27/2022    CREATININE 0.63 09/27/2022    GLUCOSE 75 09/27/2022    CALCIUM 9.0 09/27/2022    LABGLOM >60 09/27/2022    GFRAA >60 09/27/2022     Lab Results   Component Value Date    WBC 5.5 11/19/2021    HGB 13.0 11/19/2021    HCT 38.0 11/19/2021    MCV 88 11/19/2021     11/19/2021     Lab Results   Component Value Date LABA1C 4.9 08/03/2022    LABA1C 3.5 09/29/2021     Lab Results   Component Value Date    CHOLFAST 200 11/19/2021    TRIGLYCFAST 50 11/19/2021    HDL 65 11/19/2021    LDLCALC 125 11/19/2021     No results found for: TESTM  Lab Results   Component Value Date    TSH 0.03 (L) 02/17/2023    TSHREFLEX 1.780 10/29/2021    T4FREE 1.15 10/29/2021     Lab Results   Component Value Date    TPOABS 205.0 (H) 09/29/2021       Review of Systems   Cardiovascular: Negative. Endocrine: Negative. Genitourinary: Negative. All other systems reviewed and are negative. Objective:   Physical Exam  Vitals reviewed. Constitutional:       General: She is not in acute distress. Appearance: Normal appearance. She is obese. HENT:      Head: Normocephalic and atraumatic. Right Ear: External ear normal.      Left Ear: External ear normal.      Nose: Nose normal.   Eyes:      General: No scleral icterus. Right eye: No discharge. Left eye: No discharge. Extraocular Movements: Extraocular movements intact. Conjunctiva/sclera: Conjunctivae normal.   Cardiovascular:      Rate and Rhythm: Normal rate. Pulmonary:      Effort: Pulmonary effort is normal.   Musculoskeletal:         General: Normal range of motion. Cervical back: Normal range of motion and neck supple. Neurological:      General: No focal deficit present. Mental Status: She is alert and oriented to person, place, and time.    Psychiatric:         Mood and Affect: Mood normal.         Behavior: Behavior normal.

## 2023-03-25 PROBLEM — Z12.4 SCREENING FOR CERVICAL CANCER: Status: ACTIVE | Noted: 2023-03-25

## 2023-03-25 PROBLEM — Z11.59 NEED FOR HEPATITIS C SCREENING TEST: Status: ACTIVE | Noted: 2023-03-25

## 2023-03-25 PROBLEM — K21.9 GASTROESOPHAGEAL REFLUX DISEASE WITHOUT ESOPHAGITIS: Status: ACTIVE | Noted: 2023-03-25

## 2023-03-25 PROBLEM — D57.3 SICKLE CELL TRAIT (HCC): Status: ACTIVE | Noted: 2023-03-25

## 2023-04-24 PROBLEM — Z11.59 NEED FOR HEPATITIS C SCREENING TEST: Status: RESOLVED | Noted: 2023-03-25 | Resolved: 2023-04-24

## 2023-04-24 PROBLEM — Z12.4 SCREENING FOR CERVICAL CANCER: Status: RESOLVED | Noted: 2023-03-25 | Resolved: 2023-04-24

## 2023-06-05 ENCOUNTER — HOSPITAL ENCOUNTER (OUTPATIENT)
Age: 38
Discharge: HOME | End: 2023-06-05

## 2023-06-24 PROBLEM — Z11.59 NEED FOR HEPATITIS C SCREENING TEST: Status: ACTIVE | Noted: 2023-03-25

## 2023-06-24 PROBLEM — Z12.4 CERVICAL CANCER SCREENING: Status: ACTIVE | Noted: 2023-03-25

## 2023-06-24 PROBLEM — Z13.1 SCREENING FOR DIABETES MELLITUS (DM): Status: ACTIVE | Noted: 2023-06-24

## 2023-06-29 ENCOUNTER — OFFICE VISIT (OUTPATIENT)
Facility: CLINIC | Age: 38
End: 2023-06-29
Payer: COMMERCIAL

## 2023-06-29 VITALS
OXYGEN SATURATION: 99 % | HEART RATE: 66 BPM | TEMPERATURE: 98.5 F | SYSTOLIC BLOOD PRESSURE: 129 MMHG | HEIGHT: 66 IN | WEIGHT: 204.2 LBS | DIASTOLIC BLOOD PRESSURE: 81 MMHG | BODY MASS INDEX: 32.82 KG/M2

## 2023-06-29 DIAGNOSIS — Z12.4 CERVICAL CANCER SCREENING: ICD-10-CM

## 2023-06-29 DIAGNOSIS — F32.89 OTHER DEPRESSION: ICD-10-CM

## 2023-06-29 DIAGNOSIS — Z11.59 NEED FOR HEPATITIS C SCREENING TEST: ICD-10-CM

## 2023-06-29 DIAGNOSIS — D57.3 SICKLE CELL TRAIT (HCC): ICD-10-CM

## 2023-06-29 DIAGNOSIS — F43.9 STRESS: ICD-10-CM

## 2023-06-29 DIAGNOSIS — F41.9 ANXIETY: ICD-10-CM

## 2023-06-29 DIAGNOSIS — J30.1 SEASONAL ALLERGIC RHINITIS DUE TO POLLEN: ICD-10-CM

## 2023-06-29 DIAGNOSIS — E55.9 VITAMIN D DEFICIENCY: ICD-10-CM

## 2023-06-29 DIAGNOSIS — Z13.1 SCREENING FOR DIABETES MELLITUS (DM): ICD-10-CM

## 2023-06-29 DIAGNOSIS — K21.9 GASTROESOPHAGEAL REFLUX DISEASE WITHOUT ESOPHAGITIS: ICD-10-CM

## 2023-06-29 PROBLEM — F32.A DEPRESSION: Status: ACTIVE | Noted: 2023-06-29

## 2023-06-29 PROCEDURE — 99204 OFFICE O/P NEW MOD 45 MIN: CPT | Performed by: INTERNAL MEDICINE

## 2023-06-29 RX ORDER — LORATADINE 10 MG/1
10 TABLET ORAL DAILY
Qty: 30 TABLET | Refills: 3 | Status: SHIPPED | OUTPATIENT
Start: 2023-06-29

## 2023-06-29 RX ORDER — KETOCONAZOLE 20 MG/ML
SHAMPOO TOPICAL
COMMUNITY
Start: 2023-05-02

## 2023-06-29 RX ORDER — HYDROXYZINE HYDROCHLORIDE 25 MG/1
25 TABLET, FILM COATED ORAL DAILY PRN
COMMUNITY

## 2023-06-29 RX ORDER — DULOXETIN HYDROCHLORIDE 30 MG/1
30 CAPSULE, DELAYED RELEASE ORAL
COMMUNITY

## 2023-06-29 RX ORDER — MELOXICAM 15 MG/1
TABLET ORAL
COMMUNITY
Start: 2022-12-19 | End: 2023-06-29 | Stop reason: ALTCHOICE

## 2023-06-29 RX ORDER — GABAPENTIN 300 MG/1
CAPSULE ORAL
COMMUNITY
Start: 2022-12-01

## 2023-06-29 RX ORDER — KETOCONAZOLE 20 MG/G
CREAM TOPICAL
COMMUNITY
Start: 2023-05-02

## 2023-06-29 RX ORDER — FLUTICASONE PROPIONATE 50 MCG
2 SPRAY, SUSPENSION (ML) NASAL DAILY
Qty: 16 G | Refills: 2 | Status: SHIPPED | OUTPATIENT
Start: 2023-06-29

## 2023-06-29 ASSESSMENT — PATIENT HEALTH QUESTIONNAIRE - PHQ9
SUM OF ALL RESPONSES TO PHQ QUESTIONS 1-9: 2
2. FEELING DOWN, DEPRESSED OR HOPELESS: 1
1. LITTLE INTEREST OR PLEASURE IN DOING THINGS: 1
SUM OF ALL RESPONSES TO PHQ QUESTIONS 1-9: 2
SUM OF ALL RESPONSES TO PHQ QUESTIONS 1-9: 2
SUM OF ALL RESPONSES TO PHQ9 QUESTIONS 1 & 2: 2
SUM OF ALL RESPONSES TO PHQ QUESTIONS 1-9: 2

## 2023-06-29 ASSESSMENT — ENCOUNTER SYMPTOMS
RESPIRATORY NEGATIVE: 1
GASTROINTESTINAL NEGATIVE: 1
EYES NEGATIVE: 1
ALLERGIC/IMMUNOLOGIC NEGATIVE: 1

## 2023-07-24 PROBLEM — Z11.59 NEED FOR HEPATITIS C SCREENING TEST: Status: RESOLVED | Noted: 2023-03-25 | Resolved: 2023-07-24

## 2023-07-24 PROBLEM — Z13.1 SCREENING FOR DIABETES MELLITUS (DM): Status: RESOLVED | Noted: 2023-06-24 | Resolved: 2023-07-24

## 2023-07-24 PROBLEM — Z12.4 CERVICAL CANCER SCREENING: Status: RESOLVED | Noted: 2023-03-25 | Resolved: 2023-07-24

## 2023-08-18 ENCOUNTER — HOSPITAL ENCOUNTER (EMERGENCY)
Facility: HOSPITAL | Age: 38
Discharge: HOME | End: 2023-08-18
Payer: COMMERCIAL

## 2023-08-18 ENCOUNTER — APPOINTMENT (OUTPATIENT)
Facility: HOSPITAL | Age: 38
End: 2023-08-18
Payer: COMMERCIAL

## 2023-08-18 VITALS
WEIGHT: 202 LBS | HEIGHT: 65 IN | HEART RATE: 81 BPM | BODY MASS INDEX: 33.66 KG/M2 | OXYGEN SATURATION: 98 % | TEMPERATURE: 99 F | DIASTOLIC BLOOD PRESSURE: 72 MMHG | SYSTOLIC BLOOD PRESSURE: 120 MMHG | RESPIRATION RATE: 18 BRPM

## 2023-08-18 DIAGNOSIS — S16.1XXA STRAIN OF NECK MUSCLE, INITIAL ENCOUNTER: ICD-10-CM
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
## 2023-08-18 DIAGNOSIS — S39.012A STRAIN OF LUMBAR REGION, INITIAL ENCOUNTER: ICD-10-CM

## 2023-08-18 PROCEDURE — 72125 CT NECK SPINE W/O DYE: CPT

## 2023-08-18 PROCEDURE — 72131 CT LUMBAR SPINE W/O DYE: CPT

## 2023-08-18 PROCEDURE — 99284 EMERGENCY DEPT VISIT MOD MDM: CPT

## 2023-08-18 PROCEDURE — 6370000000 HC RX 637 (ALT 250 FOR IP): Performed by: NURSE PRACTITIONER

## 2023-08-18 PROCEDURE — 6360000002 HC RX W HCPCS: Performed by: NURSE PRACTITIONER

## 2023-08-18 PROCEDURE — 96372 THER/PROPH/DIAG INJ SC/IM: CPT

## 2023-08-18 RX ORDER — METHOCARBAMOL 500 MG/1
500 TABLET, FILM COATED ORAL 3 TIMES DAILY PRN
Qty: 10 TABLET | Refills: 0 | Status: SHIPPED | OUTPATIENT
Start: 2023-08-18

## 2023-08-18 RX ORDER — LIDOCAINE 4 G/G
1 PATCH TOPICAL EVERY 12 HOURS PRN
Qty: 10 PATCH | Refills: 0 | Status: SHIPPED | OUTPATIENT
Start: 2023-08-18 | End: 2023-08-28

## 2023-08-18 RX ORDER — NAPROXEN 500 MG/1
500 TABLET ORAL 2 TIMES DAILY
Qty: 30 TABLET | Refills: 0 | Status: SHIPPED | OUTPATIENT
Start: 2023-08-18

## 2023-08-18 RX ORDER — ACETAMINOPHEN 500 MG
1000 TABLET ORAL
Status: COMPLETED | OUTPATIENT
Start: 2023-08-18 | End: 2023-08-18

## 2023-08-18 RX ORDER — KETOROLAC TROMETHAMINE 15 MG/ML
15 INJECTION, SOLUTION INTRAMUSCULAR; INTRAVENOUS ONCE
Status: COMPLETED | OUTPATIENT
Start: 2023-08-18 | End: 2023-08-18

## 2023-08-18 RX ADMIN — KETOROLAC TROMETHAMINE 15 MG: 15 INJECTION, SOLUTION INTRAMUSCULAR; INTRAVENOUS at 12:01

## 2023-08-18 RX ADMIN — ACETAMINOPHEN 1000 MG: 500 TABLET ORAL at 12:01

## 2023-08-18 ASSESSMENT — PAIN - FUNCTIONAL ASSESSMENT: PAIN_FUNCTIONAL_ASSESSMENT: 0-10

## 2023-08-18 ASSESSMENT — PAIN SCALES - GENERAL: PAINLEVEL_OUTOF10: 8
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
## 2023-11-14 ENCOUNTER — HOSPITAL ENCOUNTER
Dept: HOSPITAL 101 - FBCO | Age: 38
End: 2023-11-14
Payer: COMMERCIAL

## 2023-11-14 VITALS — DIASTOLIC BLOOD PRESSURE: 84 MMHG | HEART RATE: 69 BPM | SYSTOLIC BLOOD PRESSURE: 137 MMHG

## 2023-11-14 DIAGNOSIS — E03.9: ICD-10-CM

## 2023-11-14 DIAGNOSIS — Z3A.00: ICD-10-CM

## 2023-11-14 DIAGNOSIS — O99.280: ICD-10-CM

## 2023-11-14 DIAGNOSIS — O09.529: Primary | ICD-10-CM

## 2023-11-14 PROCEDURE — 59025 FETAL NON-STRESS TEST: CPT

## 2023-11-19 ENCOUNTER — HOSPITAL ENCOUNTER (INPATIENT)
Age: 38
LOS: 3 days | Discharge: HOME | End: 2023-11-22
Payer: COMMERCIAL

## 2023-11-19 VITALS
OXYGEN SATURATION: 99 % | SYSTOLIC BLOOD PRESSURE: 146 MMHG | HEART RATE: 70 BPM | DIASTOLIC BLOOD PRESSURE: 68 MMHG | RESPIRATION RATE: 20 BRPM

## 2023-11-19 VITALS — OXYGEN SATURATION: 97 %

## 2023-11-19 VITALS — OXYGEN SATURATION: 99 % | RESPIRATION RATE: 11 BRPM | HEART RATE: 73 BPM

## 2023-11-19 VITALS — DIASTOLIC BLOOD PRESSURE: 81 MMHG | OXYGEN SATURATION: 97 % | SYSTOLIC BLOOD PRESSURE: 139 MMHG

## 2023-11-19 VITALS — DIASTOLIC BLOOD PRESSURE: 73 MMHG | OXYGEN SATURATION: 97 % | SYSTOLIC BLOOD PRESSURE: 119 MMHG

## 2023-11-19 VITALS
OXYGEN SATURATION: 100 % | DIASTOLIC BLOOD PRESSURE: 81 MMHG | RESPIRATION RATE: 12 BRPM | SYSTOLIC BLOOD PRESSURE: 134 MMHG | HEART RATE: 66 BPM

## 2023-11-19 VITALS — SYSTOLIC BLOOD PRESSURE: 128 MMHG | DIASTOLIC BLOOD PRESSURE: 68 MMHG | OXYGEN SATURATION: 97 %

## 2023-11-19 VITALS
OXYGEN SATURATION: 100 % | DIASTOLIC BLOOD PRESSURE: 88 MMHG | SYSTOLIC BLOOD PRESSURE: 142 MMHG | RESPIRATION RATE: 14 BRPM | HEART RATE: 90 BPM

## 2023-11-19 VITALS — TEMPERATURE: 97.88 F | RESPIRATION RATE: 16 BRPM

## 2023-11-19 VITALS — DIASTOLIC BLOOD PRESSURE: 74 MMHG | SYSTOLIC BLOOD PRESSURE: 118 MMHG | HEART RATE: 94 BPM

## 2023-11-19 VITALS
HEART RATE: 60 BPM | TEMPERATURE: 97.6 F | SYSTOLIC BLOOD PRESSURE: 139 MMHG | DIASTOLIC BLOOD PRESSURE: 75 MMHG | OXYGEN SATURATION: 95 % | RESPIRATION RATE: 16 BRPM

## 2023-11-19 VITALS
SYSTOLIC BLOOD PRESSURE: 139 MMHG | RESPIRATION RATE: 11 BRPM | OXYGEN SATURATION: 99 % | HEART RATE: 61 BPM | DIASTOLIC BLOOD PRESSURE: 68 MMHG

## 2023-11-19 VITALS — OXYGEN SATURATION: 98 %

## 2023-11-19 VITALS
SYSTOLIC BLOOD PRESSURE: 121 MMHG | HEART RATE: 68 BPM | RESPIRATION RATE: 14 BRPM | DIASTOLIC BLOOD PRESSURE: 77 MMHG | OXYGEN SATURATION: 99 %

## 2023-11-19 VITALS — SYSTOLIC BLOOD PRESSURE: 110 MMHG | RESPIRATION RATE: 20 BRPM | HEART RATE: 59 BPM | DIASTOLIC BLOOD PRESSURE: 71 MMHG

## 2023-11-19 VITALS — SYSTOLIC BLOOD PRESSURE: 154 MMHG | DIASTOLIC BLOOD PRESSURE: 91 MMHG | OXYGEN SATURATION: 98 %

## 2023-11-19 VITALS — SYSTOLIC BLOOD PRESSURE: 114 MMHG | DIASTOLIC BLOOD PRESSURE: 78 MMHG | OXYGEN SATURATION: 98 %

## 2023-11-19 VITALS — DIASTOLIC BLOOD PRESSURE: 68 MMHG | SYSTOLIC BLOOD PRESSURE: 145 MMHG

## 2023-11-19 VITALS — RESPIRATION RATE: 25 BRPM | OXYGEN SATURATION: 99 % | HEART RATE: 68 BPM

## 2023-11-19 VITALS — RESPIRATION RATE: 16 BRPM

## 2023-11-19 VITALS — DIASTOLIC BLOOD PRESSURE: 73 MMHG | OXYGEN SATURATION: 98 % | SYSTOLIC BLOOD PRESSURE: 151 MMHG

## 2023-11-19 VITALS
SYSTOLIC BLOOD PRESSURE: 120 MMHG | DIASTOLIC BLOOD PRESSURE: 70 MMHG | OXYGEN SATURATION: 97 % | HEART RATE: 63 BPM | RESPIRATION RATE: 18 BRPM

## 2023-11-19 VITALS
HEART RATE: 74 BPM | SYSTOLIC BLOOD PRESSURE: 139 MMHG | RESPIRATION RATE: 25 BRPM | DIASTOLIC BLOOD PRESSURE: 82 MMHG | OXYGEN SATURATION: 97 %

## 2023-11-19 VITALS — SYSTOLIC BLOOD PRESSURE: 143 MMHG | OXYGEN SATURATION: 97 % | DIASTOLIC BLOOD PRESSURE: 68 MMHG

## 2023-11-19 VITALS — OXYGEN SATURATION: 96 %

## 2023-11-19 VITALS — HEART RATE: 57 BPM | DIASTOLIC BLOOD PRESSURE: 80 MMHG | SYSTOLIC BLOOD PRESSURE: 130 MMHG

## 2023-11-19 VITALS — TEMPERATURE: 98.06 F | RESPIRATION RATE: 18 BRPM

## 2023-11-19 VITALS — RESPIRATION RATE: 16 BRPM | TEMPERATURE: 98.6 F

## 2023-11-19 VITALS — TEMPERATURE: 97.8 F | RESPIRATION RATE: 16 BRPM

## 2023-11-19 DIAGNOSIS — O48.0: ICD-10-CM

## 2023-11-19 DIAGNOSIS — Z3A.40: ICD-10-CM

## 2023-11-19 DIAGNOSIS — E03.9: ICD-10-CM

## 2023-11-19 LAB
CANNABINOID SCREEN URINE: NEGATIVE
CAST SEEN?: (no result) #/LPF
GLUCOSE URINE UA: NEGATIVE MG/DL
HCT VFR BLD CALC: 36.4 % (ref 36–48)
HEMATOCRIT: 36.4 % (ref 36–48)
HEMOGLOBIN: 12.9 G/DL (ref 12–16)
MCV RBC: 91.2 FL (ref 81–99)
MEAN CORPUSCULAR HEMOGLOBIN: 32.3 PG (ref 26.7–34)
MEAN CORPUSCULAR HGB CONC: 35.4 G/DL (ref 29.9–35.2)
MEAN CORPUSCULAR VOLUME: 91.2 FL (ref 81–99)
METHAMPHETAMINES SCREEN URINE: NEGATIVE
PLATELET # BLD: 161 10^3/UL (ref 150–450)
PLATELET COUNT: 161 10^3/UL (ref 150–450)
RED BLOOD COUNT: 3.99 10^6/UL (ref 4.2–5.4)
TRICYCLIC ANTIDEPRESSANT URINE: NEGATIVE
WBC # BLD: 10 10^3/UL (ref 4–11)
WHITE BLOOD COUNT: 10 10^3/UL (ref 4–11)

## 2023-11-19 PROCEDURE — 96372 THER/PROPH/DIAG INJ SC/IM: CPT

## 2023-11-19 PROCEDURE — 86900 BLOOD TYPING SEROLOGIC ABO: CPT

## 2023-11-19 PROCEDURE — 85027 COMPLETE CBC AUTOMATED: CPT

## 2023-11-19 PROCEDURE — 86850 RBC ANTIBODY SCREEN: CPT

## 2023-11-19 PROCEDURE — 64486 TAP BLOCK UNIL BY INJECTION: CPT

## 2023-11-19 PROCEDURE — 85025 COMPLETE CBC W/AUTO DIFF WBC: CPT

## 2023-11-19 PROCEDURE — 36415 COLL VENOUS BLD VENIPUNCTURE: CPT

## 2023-11-19 PROCEDURE — 96374 THER/PROPH/DIAG INJ IV PUSH: CPT

## 2023-11-19 PROCEDURE — 96375 TX/PRO/DX INJ NEW DRUG ADDON: CPT

## 2023-11-19 PROCEDURE — 80307 DRUG TEST PRSMV CHEM ANLYZR: CPT

## 2023-11-19 PROCEDURE — 59050 FETAL MONITOR W/REPORT: CPT

## 2023-11-19 PROCEDURE — 88307 TISSUE EXAM BY PATHOLOGIST: CPT

## 2023-11-19 PROCEDURE — 81001 URINALYSIS AUTO W/SCOPE: CPT

## 2023-11-19 PROCEDURE — 86901 BLOOD TYPING SEROLOGIC RH(D): CPT

## 2023-11-19 NOTE — P.EN_ITS
Event Note    
Event Note:     
First Assist Note:  I first assisted Dr Garcia with a primary  section.    
I assisted Dr Garcia as directed.  I independently closed the SQ layer with 3-0   
vicryl without difficulty.  I then independently closed the skiin incision with   
4-0 vicryl on a Michael needle without difficulty. Hemostasis noted at completion.  
 Patient tolerated procedure well.

## 2023-11-19 NOTE — P.OBPRC_ITS
Procedure    
Pre-op/Post-op diagnoses:     
                            Pre-Op/Post-Op Diagnoses    
    
Operation Date: 23 17:00    
<No data on this case meets the specified criteria>    
    
    
Procedure:     
                                   Procedures    
    
                         Operation Date: 23 17:00    
    
    
Actual Procedure Side Surgeon    
    
p , viable baby girl born Not Applicable Bladimir Garcia DO    
    
    
    
Assistant: CRYSTAL SAAB    
Estimated blood loss (mL): 575    
Disposition: floor    
Anesthesia type: Spinal

## 2023-11-19 NOTE — PM.OBHP
OB - H&P: HPI
History of Present Illness
Chief complaint: FBC
: 2
Para: 0
Gestational age based on last menstrual period: 41 weeks
Comments: 
patient has SROM at 6:30 this morning and patient called me at 2:38 pm with report of rupture.  she then came to [facility] and placed on EFM and with first contraction it was noted that there was a later deceleration.  she then had a variable and another 
late.  I gave orders and went to the hosptial.  After discussion with patient regarding the risk factors of AMA, thyroid, post dates, late deceleration, absent accelerations when I assessed the strip, and cervix is closed and not able to augment 
with pitocin.  Patient was recommended a primary  section for the risk factors just listed.  Patient is tearful, but agrees to the  section.  All questions answered, consent obtained.  Dr Garcia notified and updated and surgery team 
called.  
History of Present Pregnancy
Dating criteria: LMP confirmed by 1st trimester US
Prenatal care: good care
Ultrasounds: normal 1st trimester US and normal mid trimester US
Pregnancy complications comment: AMA, hythyroidism, refusal for induction at 39 weeks due to AMA and thyroid
Narrative: 
hypothyroid 
Prenatal Labs
Blood type: O (+) positive
Rubella: immune
GBS status: negative
HBsAG: negative

Review of Systems
ROS  
 Status of ROS 10 or more systems reviewed and unremarkable except as noted in history and below   

[facility]
Medical History (Updated 23 @ 16:48 by RYLIE FORREST, LEÓN)

Hashimoto's disease
 ?E06.3 - Autoimmune thyroiditis (ICD-10)
Hypothyroid
 ?E03.9 - Hypothyroidism, unspecified (ICD-10)
PCOS (polycystic ovarian syndrome)
 ?E28.2 - Polycystic ovarian syndrome (ICD-10)
Spontaneous 
 ?O03.9 - Complete or unspecified spontaneous  without complication (ICD-10)


Surgical History (Updated 23 @ 15:32 by Chelsi Edwards)

History of dilatation and curettage
 ?Z98.890 - Other specified postprocedural states (ICD-10)



Meds
Home Medications and Allergies
Home Medications

 Medication  Instructions  Recorded  Confirmed  Type
levothyroxine 25 mcg tablet mcg 23  History
prenatal vit no.95-ferrous tab PO 23  History
fumarate 28 mg-folic acid 800 mcg    
tablet (Prenatal)    


Allergies

Allergy/AdvReac Type Severity Reaction Status Date / Time
gluten Allergy Unknown  Uncoded 23 16:48



Exam
Constitutional
Vital Signs, click to edit/add: 
Last Vital Signs

Pulse  57 L  23 16:17
Resp  16   23 15:10
BP  130/80   23 16:17
O2 Del Method  Room Air  23 15:10


Documenting provider has reviewed patient's vital signs: yes
Common normals: no apparent distress and oriented x3
Orientation/consciousness: Yes awake
HENMT
Common normals: normocephalic
Eye
Common normals: EOMs intact bilaterally
Neck & C-Spine
Common normals: full ROM and no JVD
Lymph
Lymphatic: no lymphadenopathy noted
Chest
Common normals: inspection of chest normal
Respiratory
Common normals: normal respiratory effort, no retractions and clear to auscultation bilaterally
Cardio
Common normals: regular rate, regular rhythm and no murmurs
Rate: regular rate
GI
Common normals: Normal to inspection, nondistended, normoactive bowel sounds present

Common normals: no CVA tenderness
Back & Pelvis
Common normals: no CVA tenderness
Extremity
Common normals: normal to inspection and full ROM
Neuro
Common normals: oriented x3 and moves all extremities
Sensorium/orientation: awake, alert, oriented to person, oriented to place and oriented to time
Psych
Common normals: mental status grossly normal, thought process normal and cooperative

Results
Labs
Labs: 
Short CBC

  23 Range/Units
  15:30 
WBC  10.0  (4.0-11.0)  10^3/uL
Hgb  12.9  (12.0-16.0)  g/dL
Hct  36.4  (36.0-48.0)  %
Plt Count  161  (150-450)  10^3/uL



OB - A/P
Assessment and Plan
(1) Post-dates pregnancy: 

Plan
primary  section

## 2023-11-19 NOTE — PM.ONB
Brief Operative Note
Date of procedure: 23
Pre-op diagnosis: iup at 40 6/7wks, post dates, ama, hypothyroidism, recurrent variable decel
Post-op diagnosis: same as pre-op
Procedure: 
NAME OF PROCEDURE: [ section ]


PROCEDURE: Patient was taken back to the Operating Room where she was given a spinal anesthesia with Duramorph without difficulty. She was prepped and draped in the normal sterile fashion. A Pfannenstiel skin incision was then made 2 cm above the 
symphysis pubis and carried down to underlying rectus fascia using a Bovie. The fascia was incised in the midline and extended laterally using Smith scissors. Two Kocher clamps were placed on the superior aspect of the fascia and dissected off the 
underlying rectus muscles. The same was performed on the inferior aspect as well. The muscles were then  in the midline. Peritoneum was identified and entered bluntly. The peritoneum was then extended superiorly and inferiorly with good 
visualization of the bladder. The bladder blade was inserted. A low transverse incision was made on the patient's uterus and extended laterally digitally. The infant was then delivered atraumatically after the bladder blade was removed in the 
cephalic position. The cord was clamped and cut. Cord blood was obtained. The infant was handed off to awaiting  team. The patient's placenta was spontaneously delivered. The uterus was then exteriorized. The uterus was cleared of all clots 
and debris. The bladder blade was reinserted. The patient's uterine incision was closed using #0 Vicryl in a running lock fashion. Excellent hemostasis was assured. The uterus was then returned to the patient's abdomen. The patient's abdomen was 
copiously irrigated using warm saline. Peritoneal gutters were cleared of all clots and debris. Again excellent hemostasis was assured. The patient's peritoneum was closed using 3-0 Vicryl in a running fashion. The patient's fascia was closed using 
#0 Vicryl in a running fashion. The patient's skin was closed using 4-0 Vicryl subcuticularly. The patient tolerated the procedure well. Sponge, lap, and needle counts were correct x2. The patient was taken to the Recovery Room in stable condition.

Anesthesia: spinal
Surgeon: Bladimir Garcia
Assistant: CRYSTAL SAAB
Estimated blood loss (mL): 575
Pathology: other (placenta)
Condition: stable
Disposition: PACU

## 2023-11-19 NOTE — P.OBHP_ITS
OB - H&P: HPI    
History of Present Illness    
Chief complaint: FBC    
: 2    
Para: 0    
Gestational age based on last menstrual period: 41 weeks    
Comments:     
patient has SROM at 6:30 this morning and patient called me at 2:38 pm with   
report of rupture.  she then came to [facility] and placed on EFM and with first   
contraction it was noted that there was a later deceleration.  she then had a   
variable and another late.  I gave orders and went to the hosptial.  After   
discussion with patient regarding the risk factors of AMA, thyroid, post dates,   
late deceleration, absent accelerations when I assessed the strip, and cervix is  
closed and not able to augment with pitocin.  Patient was recommended a primary   
 section for the risk factors just listed.  Patient is tearful, but   
agrees to the  section.  All questions answered, consent obtained.  Dr Garcia notified and updated and surgery team called.      
History of Present Pregnancy    
Dating criteria: LMP confirmed by 1st trimester US    
Prenatal care: good care    
Ultrasounds: normal 1st trimester US and normal mid trimester US    
Pregnancy complications comment: AMA, hythyroidism, refusal for induction at 39   
weeks due to AMA and thyroid    
Narrative:     
hypothyroid     
Prenatal Labs    
Blood type: O (+) positive    
Rubella: immune    
GBS status: negative    
HBsAG: negative    
    
Review of Systems    
    
    
ROS      
    
 Status of ROS 10 or more systems reviewed and unremarkable except as noted in   
history and below       
    
    
[facility]    
Medical History (Updated 23 @ 16:48 by RYLIE FORREST, LEÓN)    
    
Hashimoto's disease    
   ?E06.3 - Autoimmune thyroiditis (ICD-10)    
Hypothyroid    
   ?E03.9 - Hypothyroidism, unspecified (ICD-10)    
PCOS (polycystic ovarian syndrome)    
   ?E28.2 - Polycystic ovarian syndrome (ICD-10)    
Spontaneous     
   ?O03.9 - Complete or unspecified spontaneous  without complication   
   (ICD-10)    
    
    
Surgical History (Updated 23 @ 15:32 by Chelsi Edwards)    
    
History of dilatation and curettage    
   ?Z98.890 - Other specified postprocedural states (ICD-10)    
    
    
    
Meds    
Home Medications and Allergies    
                                Home Medications    
    
    
    
 Medication  Instructions  Recorded  Confirmed  Type    
     
levothyroxine 25 mcg tablet mcg 23  History    
     
prenatal vit no.95-ferrous tab PO 23  History    
    
fumarate 28 mg-folic acid 800 mcg        
    
tablet (Prenatal)        
    
    
    
                                    Allergies    
    
    
    
Allergy/AdvReac Type Severity Reaction Status Date / Time    
     
gluten Allergy Unknown  Uncoded 23 16:48    
    
    
    
    
Exam    
Constitutional    
Vital Signs, click to edit/add:     
                                Last Vital Signs    
    
    
    
Pulse  57 L  23 16:17    
     
Resp  16   23 15:10    
     
BP  130/80   23 16:17    
     
O2 Del Method  Room Air  23 15:10    
    
    
    
Documenting provider has reviewed patient's vital signs: yes    
Common normals: no apparent distress and oriented x3    
Orientation/consciousness: Yes awake    
HENMT    
Common normals: normocephalic    
Eye    
Common normals: EOMs intact bilaterally    
Neck & C-Spine    
Common normals: full ROM and no JVD    
Lymph    
Lymphatic: no lymphadenopathy noted    
Chest    
Common normals: inspection of chest normal    
Respiratory    
Common normals: normal respiratory effort, no retractions and clear to   
auscultation bilaterally    
Cardio    
Common normals: regular rate, regular rhythm and no murmurs    
Rate: regular rate    
GI    
Common normals: Normal to inspection, nondistended, normoactive bowel sounds   
present    
    
Common normals: no CVA tenderness    
Back & Pelvis    
Common normals: no CVA tenderness    
Extremity    
Common normals: normal to inspection and full ROM    
Neuro    
Common normals: oriented x3 and moves all extremities    
Sensorium/orientation: awake, alert, oriented to person, oriented to place and   
oriented to time    
Psych    
Common normals: mental status grossly normal, thought process normal and   
cooperative    
    
Results    
Labs    
Labs:     
                                    Short CBC    
    
    
    
  23 Range/Units    
    
  15:30     
     
WBC  10.0  (4.0-11.0)  10^3/uL    
     
Hgb  12.9  (12.0-16.0)  g/dL    
     
Hct  36.4  (36.0-48.0)  %    
     
Plt Count  161  (150-450)  10^3/uL    
    
    
    
    
OB - A/P    
Assessment and Plan    
(1) Post-dates pregnancy:     
    
Plan    
primary  section

## 2023-11-20 VITALS
SYSTOLIC BLOOD PRESSURE: 121 MMHG | TEMPERATURE: 98.9 F | OXYGEN SATURATION: 95 % | HEART RATE: 54 BPM | RESPIRATION RATE: 16 BRPM | DIASTOLIC BLOOD PRESSURE: 72 MMHG

## 2023-11-20 VITALS — TEMPERATURE: 98 F | RESPIRATION RATE: 16 BRPM

## 2023-11-20 VITALS — TEMPERATURE: 97.16 F | SYSTOLIC BLOOD PRESSURE: 135 MMHG | HEART RATE: 58 BPM | DIASTOLIC BLOOD PRESSURE: 79 MMHG

## 2023-11-20 VITALS — SYSTOLIC BLOOD PRESSURE: 123 MMHG | HEART RATE: 57 BPM | DIASTOLIC BLOOD PRESSURE: 74 MMHG

## 2023-11-20 VITALS — RESPIRATION RATE: 16 BRPM

## 2023-11-20 LAB
ADD MANUAL DIFF: NO
HCT VFR BLD CALC: 28.5 % (ref 36–48)
HEMATOCRIT: 28.5 % (ref 36–48)
HEMOGLOBIN: 10 G/DL (ref 12–16)
IMMATURE GRANULOCYTES ABS AUTO: 0.03 10^3/UL (ref 0–0.03)
IMMATURE GRANULOCYTES PCT AUTO: 0.3 % (ref 0–0.5)
LYMPHOCYTES  ABSOLUTE AUTO: 1.2 10^3/UL (ref 1.2–3.8)
MCV RBC: 92.2 FL (ref 81–99)
MEAN CORPUSCULAR HEMOGLOBIN: 32.4 PG (ref 26.7–34)
MEAN CORPUSCULAR HGB CONC: 35.1 G/DL (ref 29.9–35.2)
MEAN CORPUSCULAR VOLUME: 92.2 FL (ref 81–99)
PLATELET # BLD: 141 10^3/UL (ref 150–450)
PLATELET COUNT: 141 10^3/UL (ref 150–450)
RED BLOOD COUNT: 3.09 10^6/UL (ref 4.2–5.4)
WBC # BLD: 10.2 10^3/UL (ref 4–11)
WHITE BLOOD COUNT: 10.2 10^3/UL (ref 4–11)

## 2023-11-20 NOTE — W.PC.ACHO
Registration Status: ADM IN
Primary Language: English
Preferred Language: English
Report given to DEVON Spencer RN. 
Active Medications

Generic Name Dose Route Start Last Admin
  Trade Name Freq  PRN Reason Stop Dose Admin
Acetaminophen  1,000 mg  11/20/23 00:01  11/20/23 12:49
  Acetaminophen 500 Mg Tablet  PO   1,000 mg
  Q6H CHRISTIANO   Administration
Al Hydroxide/Mg Hydroxide  2,400 mg  11/19/23 18:35 
  Magnesium Hydroxide 2,400 Mg/10 Ml Oral.Susp  PO  
  Q6H PRN  
  Dyspepsia  
Docusate Sodium  100 mg  11/20/23 09:00  11/20/23 08:34
  Docusate Sodium 100 Mg Capsule  PO   100 mg
  BID CHRISTIANO   Administration
Enoxaparin Sodium  40 mg  11/20/23 06:00  11/20/23 07:26
  Enoxaparin Sodium 40 Mg/0.4 Ml Syringe  SUBQ   40 mg
  Q24H CHRISTIANO   Administration
Sodium Chloride  1,000 mls @ 125 mls/hr  11/19/23 16:45 
  Sodium Chloride 0.9% 1,000 Ml  IV  
  .Q8H CHRISTIANO  
Ibuprofen  800 mg  11/21/23 02:30 
  Ibuprofen 400 Mg Tablet  PO  11/22/23 23:59 
  Q8H CHRISTIANO  
Levothyroxine Sodium  25 mcg  11/21/23 06:30 
  Levothyroxine Sodium 25 Mcg Tablet  PO  
  ACB CHRISTIANO  
Ondansetron HCl  4 mg  11/19/23 18:35 
  Ondansetron Pf 4 Mg/2 Ml Vial  IV  
  Q6H PRN  
  Nausea And Vomiting  
Ondansetron HCl  4 mg  11/19/23 15:52 
  Ondansetron 4 Mg Rapdis Tablet  SL  
  Q6H PRN  
  Nausea And Vomiting  
Oxycodone HCl  5 mg  11/19/23 18:40 
  Oxycodone Hcl 5 Mg Tablet  PO  11/22/23 23:59 
  QID PRN  
  Breakthrough Pain  
Senna  17.2 mg  11/19/23 20:00 
  Sennosides 8.6 Mg Tablet  PO  
  QHS PRN  
  Constipation  
Simethicone  80 mg  11/19/23 18:35 
  Simethicone 80 Mg Tab.Chew  PO  
  QID PRN  
  Abdominal Distention  

Respiratory

Pulse Oximetry                 95                                                
Pulse Oximetry                 95                                                
Pulse Oximetry                 97                                                
Pulse Oximetry                 98                                                
Pulse Oximetry                 97                                                
Oxygen Delivery Method         Room Air                                          


Cardiology

Heart Sounds                   Strong,Regular                                    
Heart Sounds                   Strong,Regular                                    


Renal

Bladder Pattern                Continent                                         
Bladder Pattern                Continent                                         


Catheter

Date Urinary Catheter Removed  11/20/23                                          
[Urethral]                     
Time Urinary Catheter          04:55                                             
Discontinued [Urethral]

## 2023-11-20 NOTE — PM.OBPN
OB - PN: Subj
Subjective
Patient comments: no complaints and pain well controlled
[place] infant status: doing well

Exam
Constitutional
Vital Signs, click to edit/add: 
Last Vital Signs

Temp  98.9 F   23 04:30
Pulse  54 L  23 04:30
Resp  16   23 04:30
BP  121/72   23 04:30
Pulse Ox  95   23 04:30
O2 Del Method  Room Air  23 04:30


Documenting provider has reviewed patient's vital signs: yes
Common normals: no apparent distress
Respiratory
Common normals: normal respiratory effort and clear to auscultation bilaterally
Cardio
Common normals: regular rate and regular rhythm
Extremity
Common normals: normal to inspection and no clubbing, cyanosis or edema

Results
Labs
Labs: 
Short CBC

  23 Range/Units
  15:30 05:42 
WBC  10.0  10.2  (4.0-11.0)  10^3/uL
Hgb  12.9  10.0 L  (12.0-16.0)  g/dL
Hct  36.4  28.5 L  (36.0-48.0)  %
Plt Count  161  141 L  (150-450)  10^3/uL

Urine

  23 Range/Units
  15:30 
Urine Color  Lt. yellow  (YELLOW)  
Urine Clarity  Clear  (CLEAR)  
Urine pH  6.5  (5.0-9.0)  
Ur Specific Gravity  <=1.005 A  (1.005-1.025)  
Urine Protein  Negative  (NEG/TRACE)  mg/dL
Urine Glucose (UA)  Negative  (NEGATIVE)  mg/dL



OB - PN: A/P
Assessment and Plan
(1) Post-dates pregnancy:
Postpartum Plan - 
Postpartum day: 1
Plan: routine postop  care
Time Spent with Patient
Time: 
Total time spent is greater than 50% in coordination of care (as documented) at patient's floor/unit and/or counseling patient:

Total time spent with greater than 50% in coordination of care (as documented) at patient's floor/unit and/or counseling patient: less than 15 minutes

## 2023-11-20 NOTE — P.OBPN_ITS
OB - PN: Subj    
Subjective    
Patient comments: no complaints and pain well controlled    
 infant status: doing well    
    
Exam    
Constitutional    
Vital Signs, click to edit/add:     
                                Last Vital Signs    
    
    
    
Temp  98.9 F   23 04:30    
     
Pulse  54 L  23 04:30    
     
Resp  16   23 04:30    
     
BP  121/72   23 04:30    
     
Pulse Ox  95   23 04:30    
     
O2 Del Method  Room Air  23 04:30    
    
    
    
Documenting provider has reviewed patient's vital signs: yes    
Common normals: no apparent distress    
Respiratory    
Common normals: normal respiratory effort and clear to auscultation bilaterally    
Cardio    
Common normals: regular rate and regular rhythm    
Extremity    
Common normals: normal to inspection and no clubbing, cyanosis or edema    
    
Results    
Labs    
Labs:     
                                    Short CBC    
    
    
    
  23 Range/Units    
    
  15:30 05:42     
     
WBC  10.0  10.2  (4.0-11.0)  10^3/uL    
     
Hgb  12.9  10.0 L  (12.0-16.0)  g/dL    
     
Hct  36.4  28.5 L  (36.0-48.0)  %    
     
Plt Count  161  141 L  (150-450)  10^3/uL    
    
    
                                      Urine    
    
    
    
  23 Range/Units    
    
  15:30     
     
Urine Color  Lt. yellow  (YELLOW)      
     
Urine Clarity  Clear  (CLEAR)      
     
Urine pH  6.5  (5.0-9.0)      
     
Ur Specific Gravity  <=1.005 A  (1.005-1.025)      
     
Urine Protein  Negative  (NEG/TRACE)  mg/dL    
     
Urine Glucose (UA)  Negative  (NEGATIVE)  mg/dL    
    
    
    
    
OB - PN: A/P    
Assessment and Plan    
(1) Post-dates pregnancy:    
Postpartum Plan -     
Postpartum day: 1    
Plan: routine postop  care    
Time Spent with Patient    
Time:     
Total time spent is greater than 50% in coordination of care (as documented) at   
patient's floor/unit and/or counseling patient:    
    
Total time spent with greater than 50% in coordination of care (as documented)   
at patient's floor/unit and/or counseling patient: less than 15 minutes

## 2023-11-20 NOTE — W.PC.ACHO
Registration Status: ADM IN
Primary Language: English
Preferred Language: English
Report given. Care relinquished
Active Medications

Generic Name Dose Route Start Last Admin
  Trade Name Freq  PRN Reason Stop Dose Admin
Acetaminophen  1,000 mg  11/20/23 00:01  11/20/23 07:27
  Acetaminophen 500 Mg Tablet  PO   1,000 mg
  Q6H CHRISTIANO   Administration
Al Hydroxide/Mg Hydroxide  2,400 mg  11/19/23 18:35 
  Magnesium Hydroxide 2,400 Mg/10 Ml Oral.Susp  PO  
  Q6H PRN  
  Dyspepsia  
Carboprost Tromethamine  250 mcg  11/19/23 15:47 
  Carboprost Tromethamine 250 Mcg/Ml 1 Ml  Vial  IM  11/20/23 19:00 
  Q15M PRN  
  Bleeding  
Diphenhydramine HCl  25 mg  11/19/23 18:35 
  Diphenhydramine Hcl 50 Mg/Ml (1ml) Vial  IV  11/20/23 18:40 
  Q6H PRN  
  Itching  
Docusate Sodium  100 mg  11/20/23 09:00 
  Docusate Sodium 100 Mg Capsule  PO  
  BID CHRISTIANO  
Enoxaparin Sodium  40 mg  11/20/23 06:00  11/20/23 07:26
  Enoxaparin Sodium 40 Mg/0.4 Ml Syringe  SUBQ   40 mg
  Q24H CHRISTIANO   Administration
Sodium Chloride  1,000 mls @ 125 mls/hr  11/19/23 16:45 
  Sodium Chloride 0.9% 1,000 Ml  IV  
  .Q8H CHRISTIANO  
Ibuprofen  800 mg  11/21/23 02:30 
  Ibuprofen 400 Mg Tablet  PO  11/22/23 23:59 
  Q8H CHRISTIANO  
Ketorolac Tromethamine  30 mg  11/19/23 18:30  11/20/23 02:46
  Ketorolac Tromethamine 30 Mg/Ml Vial  IVP  11/20/23 18:31  30 mg
  Q8H CHRISTIANO   Administration
Methylergonovine Maleate  0.2 mg  11/19/23 15:47 
  Methylergonovine Maleate 0.2 Mg/Ml Ampule  IM  11/21/23 15:48 
  ONCE PRN  
  Uterine Contractility/Contract  
Methylergonovine Maleate  0.2 mg  11/19/23 15:47 
  Methylergonovine Maleate 0.2 Mg Tablet  PO  11/21/23 15:48 
  Q4H PRN  
  Uterine Contractility/Contract  
Misoprostol  600 mcg  11/19/23 15:47 
  Misoprostol 100 Mcg Tablet  PO  11/21/23 15:48 
  ONCE PRN  
  Uterine Bleeding  
Misoprostol  800 mcg  11/19/23 15:47 
  Misoprostol 100 Mcg Tablet  SL  11/21/23 15:48 
  ONCE PRN  
  Uterine Bleeding  
Misoprostol  1,000 mcg  11/19/23 15:47 
  Misoprostol 100 Mcg Tablet  NC  11/21/23 15:48 
  ONCE PRN  
  Uterine Bleeding  
Ondansetron HCl  4 mg  11/19/23 18:35 
  Ondansetron Pf 4 Mg/2 Ml Vial  IV  
  Q6H PRN  
  Nausea And Vomiting  
Ondansetron HCl  4 mg  11/19/23 15:52 
  Ondansetron 4 Mg Rapdis Tablet  SL  
  Q6H PRN  
  Nausea And Vomiting  
Oxycodone HCl  5 mg  11/19/23 18:40 
  Oxycodone Hcl 5 Mg Tablet  PO  11/22/23 23:59 
  QID PRN  
  Breakthrough Pain  
Senna  17.2 mg  11/19/23 20:00 
  Sennosides 8.6 Mg Tablet  PO  
  QHS PRN  
  Constipation  
Simethicone  80 mg  11/19/23 18:35 
  Simethicone 80 Mg Tab.Chew  PO  
  QID PRN  
  Abdominal Distention  

Diet

 Category Date Time Status
 Regular Consistency Diet Diet  11/19/23 18:27 Active

IV Insertion/Site

Date of IV Line Insertion [    11/19/23                                          
Long PIV (>1.75 in) 20g right  
Hand]                          
IV Insertion Time [Long PIV (> 16:00                                             
1.75 in) 20g right Hand]       


Neurology

Patient orientation (short     person,place,time                                 
list)                          


Respiratory

Pulse Oximetry                 95                                                
Pulse Oximetry                 95                                                
Pulse Oximetry                 97                                                
Pulse Oximetry                 98                                                
Pulse Oximetry                 97                                                
Pulse Oximetry                 98                                                
Pulse Oximetry                 98                                                
Pulse Oximetry                 97                                                
Pulse Oximetry                 96                                                
Pulse Oximetry                 99                                                
Pulse Oximetry                 99                                                
Pulse Oximetry                 100                                               
Pulse Oximetry                 100                                               
Pulse Oximetry                 99                                                
Pulse Oximetry                 97                                                
Pulse Oximetry                 99                                                
Pulse Oximetry                 99                                                
Pulse Oximetry                 97                                                
Oxygen Delivery Method         Room Air                                          


Cardiology

Heart Sounds                   Strong,Regular                                    


Catheter

Date Urinary Catheter Removed  11/20/23                                          
[Urethral]                     
Time Urinary Catheter          04:55                                             
Discontinued [Urethral]

## 2023-11-20 NOTE — W.PC.ACHO
Registration Status: ADM IN
Primary Language: English
Preferred Language: English
Report received from Ashia TURNER. DEBBIE Lemus RN assumes care at 1200. 
Active Medications

Generic Name Dose Route Start Last Admin
  Trade Name Freq  PRN Reason Stop Dose Admin
Acetaminophen  1,000 mg  11/20/23 00:01  11/20/23 07:27
  Acetaminophen 500 Mg Tablet  PO   1,000 mg
  Q6H CHRISTIANO   Administration
Al Hydroxide/Mg Hydroxide  2,400 mg  11/19/23 18:35 
  Magnesium Hydroxide 2,400 Mg/10 Ml Oral.Susp  PO  
  Q6H PRN  
  Dyspepsia  
Carboprost Tromethamine  250 mcg  11/19/23 15:47 
  Carboprost Tromethamine 250 Mcg/Ml 1 Ml  Vial  IM  11/20/23 19:00 
  Q15M PRN  
  Bleeding  
Diphenhydramine HCl  25 mg  11/19/23 18:35 
  Diphenhydramine Hcl 50 Mg/Ml (1ml) Vial  IV  11/20/23 18:40 
  Q6H PRN  
  Itching  
Docusate Sodium  100 mg  11/20/23 09:00  11/20/23 08:34
  Docusate Sodium 100 Mg Capsule  PO   100 mg
  BID CHRISTIANO   Administration
Enoxaparin Sodium  40 mg  11/20/23 06:00  11/20/23 07:26
  Enoxaparin Sodium 40 Mg/0.4 Ml Syringe  SUBQ   40 mg
  Q24H CHRISTIANO   Administration
Sodium Chloride  1,000 mls @ 125 mls/hr  11/19/23 16:45 
  Sodium Chloride 0.9% 1,000 Ml  IV  
  .Q8H CHRISTIANO  
Ibuprofen  800 mg  11/21/23 02:30 
  Ibuprofen 400 Mg Tablet  PO  11/22/23 23:59 
  Q8H CHRISTIANO  
Ketorolac Tromethamine  30 mg  11/19/23 18:30  11/20/23 02:46
  Ketorolac Tromethamine 30 Mg/Ml Vial  IVP  11/20/23 18:31  30 mg
  Q8H CHRISTIANO   Administration
Methylergonovine Maleate  0.2 mg  11/19/23 15:47 
  Methylergonovine Maleate 0.2 Mg/Ml Ampule  IM  11/20/23 19:00 
  ONCE PRN  
  Uterine Contractility/Contract  
Methylergonovine Maleate  0.2 mg  11/19/23 15:47 
  Methylergonovine Maleate 0.2 Mg Tablet  PO  11/20/23 19:00 
  Q4H PRN  
  Uterine Contractility/Contract  
Misoprostol  600 mcg  11/19/23 15:47 
  Misoprostol 100 Mcg Tablet  PO  11/20/23 19:00 
  ONCE PRN  
  Uterine Bleeding  
Misoprostol  800 mcg  11/19/23 15:47 
  Misoprostol 100 Mcg Tablet  SL  11/20/23 19:00 
  ONCE PRN  
  Uterine Bleeding  
Misoprostol  1,000 mcg  11/19/23 15:47 
  Misoprostol 100 Mcg Tablet  CA  11/20/23 19:00 
  ONCE PRN  
  Uterine Bleeding  
Ondansetron HCl  4 mg  11/19/23 18:35 
  Ondansetron Pf 4 Mg/2 Ml Vial  IV  
  Q6H PRN  
  Nausea And Vomiting  
Ondansetron HCl  4 mg  11/19/23 15:52 
  Ondansetron 4 Mg Rapdis Tablet  SL  
  Q6H PRN  
  Nausea And Vomiting  
Oxycodone HCl  5 mg  11/19/23 18:40 
  Oxycodone Hcl 5 Mg Tablet  PO  11/22/23 23:59 
  QID PRN  
  Breakthrough Pain  
Senna  17.2 mg  11/19/23 20:00 
  Sennosides 8.6 Mg Tablet  PO  
  QHS PRN  
  Constipation  
Simethicone  80 mg  11/19/23 18:35 
  Simethicone 80 Mg Tab.Chew  PO  
  QID PRN  
  Abdominal Distention  

Diet

 Category Date Time Status
 Regular Consistency Diet Diet  11/19/23 18:27 Active

IV Insertion/Site

Date of IV Line Insertion [    11/19/23                                          
Long PIV (>1.75 in) 20g right  
Hand]                          
IV Insertion Time [Long PIV (> 16:00                                             
1.75 in) 20g right Hand]       


Neurology

Patient orientation (short     person,place,time                                 
list)                          


Respiratory

Pulse Oximetry                 95                                                
Pulse Oximetry                 95                                                
Pulse Oximetry                 97                                                
Pulse Oximetry                 98                                                
Pulse Oximetry                 97                                                
Pulse Oximetry                 98                                                
Pulse Oximetry                 98                                                
Pulse Oximetry                 97                                                
Pulse Oximetry                 96                                                
Pulse Oximetry                 99                                                
Pulse Oximetry                 99                                                
Pulse Oximetry                 100                                               
Pulse Oximetry                 100                                               
Pulse Oximetry                 99                                                
Pulse Oximetry                 97                                                
Pulse Oximetry                 99                                                
Pulse Oximetry                 99                                                
Pulse Oximetry                 97                                                
Oxygen Delivery Method         Room Air                                          


Cardiology

Heart Sounds                   Strong,Regular                                    
Heart Sounds                   Strong,Regular                                    


Renal

Bladder Pattern                Continent                                         


Catheter

Date Urinary Catheter Removed  11/20/23                                          
[Urethral]                     
Time Urinary Catheter          04:55                                             
Discontinued [Urethral]

## 2023-11-21 VITALS — SYSTOLIC BLOOD PRESSURE: 166 MMHG | HEART RATE: 62 BPM | DIASTOLIC BLOOD PRESSURE: 83 MMHG

## 2023-11-21 VITALS — SYSTOLIC BLOOD PRESSURE: 149 MMHG | HEART RATE: 74 BPM | DIASTOLIC BLOOD PRESSURE: 83 MMHG

## 2023-11-21 VITALS — DIASTOLIC BLOOD PRESSURE: 69 MMHG | HEART RATE: 68 BPM | SYSTOLIC BLOOD PRESSURE: 128 MMHG

## 2023-11-21 VITALS — RESPIRATION RATE: 14 BRPM | TEMPERATURE: 97.5 F

## 2023-11-21 VITALS — DIASTOLIC BLOOD PRESSURE: 71 MMHG | SYSTOLIC BLOOD PRESSURE: 140 MMHG | HEART RATE: 57 BPM | TEMPERATURE: 98.3 F

## 2023-11-21 VITALS — SYSTOLIC BLOOD PRESSURE: 132 MMHG | HEART RATE: 71 BPM | DIASTOLIC BLOOD PRESSURE: 70 MMHG

## 2023-11-21 NOTE — P.OBPN_ITS
OB - PN: Subj    
Subjective    
Patient comments: no complaints    
 feeding status: exclusively breastfeeding    
    
Exam    
Constitutional    
Vital Signs, click to edit/add:     
                                Last Vital Signs    
    
    
    
Temp  98.0 F   23 12:40    
     
Pulse  68   23 00:19    
     
Resp  16   23 12:40    
     
BP  128/69   23 00:19    
     
Pulse Ox  95   23 04:30    
     
O2 Del Method  Room Air  23 12:40    
    
    
    
Documenting provider has reviewed patient's vital signs: yes    
Common normals: no apparent distress    
HENMT    
Common normals: normocephalic    
Eye    
Common normals: EOMs intact bilaterally    
Neck & C-Spine    
Common normals: full ROM and no lymphadenopathy    
Lymph    
Lymphatic: no lymphadenopathy noted    
Chest    
Common normals: inspection of chest normal    
Respiratory    
Common normals: normal respiratory effort, no retractions and clear to   
auscultation bilaterally    
Effort & inspection: able to speak in complete sentences    
Cardio    
Common normals: regular rate, regular rhythm and no murmurs    
Rate: regular rate    
Rhythm: regular rhythm    
GI    
Common normals: Normal to inspection, nondistended, normoactive bowel sounds   
present and non-tender    
Palpation: soft    
    
Common normals: external appearance normal    
Back & Pelvis    
Common normals: no CVA tenderness    
Thoracic spine/upper back: normal to inspection    
Extremity    
Common normals: normal to inspection    
Psych    
Common normals: mental status grossly normal, thought process normal,   
cooperative, affect normal and speech normal    
    
OB - PN: A/P    
Assessment and Plan    
(1) Post-dates pregnancy:    
Postpartum Plan -     
Postpartum day: 2    
Plan: routine postop  care    
Time Spent with Patient    
Time:     
Total time spent is greater than 50% in coordination of care (as documented) at   
patient's floor/unit and/or counseling patient:    
    
Total time spent with greater than 50% in coordination of care (as documented)   
at patient's floor/unit and/or counseling patient: less than 15 minutes

## 2023-11-21 NOTE — PM.OBPN
OB - PN: Subj
Subjective
Patient comments: no complaints
[place] feeding status: exclusively breastfeeding

Exam
Constitutional
Vital Signs, click to edit/add: 
Last Vital Signs

Temp  98.0 F   23 12:40
Pulse  68   23 00:19
Resp  16   23 12:40
BP  128/69   23 00:19
Pulse Ox  95   23 04:30
O2 Del Method  Room Air  23 12:40


Documenting provider has reviewed patient's vital signs: yes
Common normals: no apparent distress
HENMT
Common normals: normocephalic
Eye
Common normals: EOMs intact bilaterally
Neck & C-Spine
Common normals: full ROM and no lymphadenopathy
Lymph
Lymphatic: no lymphadenopathy noted
Chest
Common normals: inspection of chest normal
Respiratory
Common normals: normal respiratory effort, no retractions and clear to auscultation bilaterally
Effort & inspection: able to speak in complete sentences
Cardio
Common normals: regular rate, regular rhythm and no murmurs
Rate: regular rate
Rhythm: regular rhythm
GI
Common normals: Normal to inspection, nondistended, normoactive bowel sounds present and non-tender
Palpation: soft

Common normals: external appearance normal
Back & Pelvis
Common normals: no CVA tenderness
Thoracic spine/upper back: normal to inspection
Extremity
Common normals: normal to inspection
Psych
Common normals: mental status grossly normal, thought process normal, cooperative, affect normal and speech normal

OB - PN: A/P
Assessment and Plan
(1) Post-dates pregnancy:
Postpartum Plan - 
Postpartum day: 2
Plan: routine postop  care
Time Spent with Patient
Time: 
Total time spent is greater than 50% in coordination of care (as documented) at patient's floor/unit and/or counseling patient:

Total time spent with greater than 50% in coordination of care (as documented) at patient's floor/unit and/or counseling patient: less than 15 minutes Additional Notes: Drug eruption is secondary to her chemotherapy for her metastatic melanoma \\n\\nPt overall has improved greatly on treatment plan \\n\\nShe is to continue hypoallergenic products and barrier creams, only to use TAC 0.1% cream bid x two weeks max as needed for flares Detail Level: Zone

## 2023-11-21 NOTE — PC.NURSE
LC into talk with pt.  She is holding baby skin to skin and watching educational video on breastfeeding.  Pt is very in tuned to breastfeed and eager for information.  Given copy of Sleep, Suckle and Thrive for at home support.  Mom states latching 
continues to go well.  States latching to right side a bit more difficult unless she uses football hold.  LC will continue with guidance as pt is 38 , history of PCOS, and hypothyroid.  Will continue to monitor weight for infant and milk 
transitioning in.

## 2023-11-22 VITALS — HEART RATE: 55 BPM | SYSTOLIC BLOOD PRESSURE: 155 MMHG | DIASTOLIC BLOOD PRESSURE: 72 MMHG

## 2023-11-22 VITALS — TEMPERATURE: 98.06 F | RESPIRATION RATE: 16 BRPM

## 2023-11-22 VITALS — SYSTOLIC BLOOD PRESSURE: 136 MMHG | DIASTOLIC BLOOD PRESSURE: 70 MMHG

## 2023-11-22 NOTE — P.OBPN_ITS
OB - PN: Subj    
Subjective    
Patient comments: no complaints and pain well controlled    
 infant status: doing well    
    
Exam    
Constitutional    
Vital Signs, click to edit/add:     
                                Last Vital Signs    
    
    
    
Temp  98.1 F   23 07:45    
     
Pulse  55 L  23 07:34    
     
Resp  16   23 07:45    
     
BP  136/70   23 08:43    
     
Pulse Ox  95   23 04:30    
     
O2 Del Method  Room Air  23 17:05    
    
    
    
Documenting provider has reviewed patient's vital signs: yes    
Common normals: no apparent distress    
Respiratory    
Common normals: normal respiratory effort and clear to auscultation bilaterally    
Cardio    
Common normals: regular rate and regular rhythm    
GI    
Common normals: Normal to inspection, nondistended, normoactive bowel sounds   
present    
Extremity    
Common normals: no calf tenderness    
    
OB - PN: A/P    
Assessment and Plan    
(1) Post-dates pregnancy:    
Postpartum Plan -     
Postpartum day: 3    
Plan: routine postop  care, discharge home and follow up 6 weeks    
Time Spent with Patient    
Time:     
Total time spent is greater than 50% in coordination of care (as documented) at   
patient's floor/unit and/or counseling patient:    
    
Total time spent with greater than 50% in coordination of care (as documented)   
at patient's floor/unit and/or counseling patient: less than 15 minutes

## 2023-11-22 NOTE — PM.OBPN
OB - PN: Subj
Subjective
Patient comments: no complaints and pain well controlled
[place] infant status: doing well

Exam
Constitutional
Vital Signs, click to edit/add: 
Last Vital Signs

Temp  98.1 F   23 07:45
Pulse  55 L  23 07:34
Resp  16   23 07:45
BP  136/70   23 08:43
Pulse Ox  95   23 04:30
O2 Del Method  Room Air  23 17:05


Documenting provider has reviewed patient's vital signs: yes
Common normals: no apparent distress
Respiratory
Common normals: normal respiratory effort and clear to auscultation bilaterally
Cardio
Common normals: regular rate and regular rhythm
GI
Common normals: Normal to inspection, nondistended, normoactive bowel sounds present
Extremity
Common normals: no calf tenderness

OB - PN: A/P
Assessment and Plan
(1) Post-dates pregnancy:
Postpartum Plan - 
Postpartum day: 3
Plan: routine postop  care, discharge home and follow up 6 weeks
Time Spent with Patient
Time: 
Total time spent is greater than 50% in coordination of care (as documented) at patient's floor/unit and/or counseling patient:

Total time spent with greater than 50% in coordination of care (as documented) at patient's floor/unit and/or counseling patient: less than 15 minutes

## 2023-11-28 ENCOUNTER — HOSPITAL ENCOUNTER
Age: 38
Discharge: HOME | End: 2023-11-28
Payer: COMMERCIAL

## 2023-11-28 VITALS
HEART RATE: 85 BPM | RESPIRATION RATE: 16 BRPM | SYSTOLIC BLOOD PRESSURE: 137 MMHG | OXYGEN SATURATION: 97 % | TEMPERATURE: 98 F | DIASTOLIC BLOOD PRESSURE: 93 MMHG

## 2023-11-28 NOTE — PC.NURSE
Ruby Power and 9 day old William arrive for follow up visit.  Parents states really getting the hang of this .  Both express pleasure with having infant and caring for her.  Jannet's assessment WNL  and she is without complaints at this time.  
States was seen per SHALA Gaviria CNM and steri strips removed.  Incision clear without redness or drainage.  Pt states  I am making so much milk   Very pleased that breastfeeding going well and baby is feeding well.  Revisited desires for vaginal 
delivery and states  I am pretty much at peace with all of that because she is nursing so well   Able to latch and feed independently.   very supportive and involved with wife and infant.  Baby William gaining weight, feeds well, and has 8-10 
wets with 6+ yellow stools daily.  Mom encouraged to attend MOMS group in December.  Verbalized confidence in self and breastfeeding aware to call as needed.

## 2023-12-19 NOTE — XMS_ITS
Comprehensive CCD (C-CDA v2.1)  
  
                          Created on: 2023  
  
  
TOM TIPTON  
External Reference #: [id number]  
: 1985  
Sex: Female  
  
Demographics  
  
  
                                        Address             [address] [place]  [address]  
   
                                        Home Phone          [phone]  
   
                                        Home Phone          [phone]  
   
                                        Preferred Language  en  
   
                                        Marital Status        
   
                                        Tenriism Affiliation Unknown  
   
                                        Race                White  
   
                                        Ethnic Group        Not  or Lati  
no  
  
  
Author  
  
  
                                        Name                Unknown  
   
                                        Address             [address]  
[address] [place] [address]  [address]  
   
                                        Phone               [phone]  
   
                                        Organization        [facility]  
  
  
Care Team Providers  
  
  
                                Care Team Member Name Role            Phone  
   
                                IVON BENNETT   Attending       Unavailable  
   
                                SABRINA, IVON   Admitting       Unavailable  
   
                                SABRINA, IVON   Consulting      Unavailable  
   
                                IVON BENNETT   Attending       Unavailable  
   
                                DR EDWARDO KIM Consulting      Unavailable  
   
                                SABRINA, IVON   Admitting       Unavailable  
   
                                IVON BENNETT   Consulting      Unavailable  
   
                                TERESA, NURA     Referring       Unavailable  
   
                                [facility]    Unavailable  
   
                                [facility]  
 Provider [phone]  
   
                                [facility]  
 Provider [phone]  
   
                                [facility]  
 Provider [phone]  
   
                                NURA MOORE     Referring       Unavailable  
   
                                [facility]    Unavail  
able  
   
                                NURA MOORE     Referring       Unavailable  
   
                                [facility]    Unavail  
able  
   
                                TERESA, NURA     Referring       Unavailable  
   
                                [facility]    Unavail  
able  
   
                                NURA MOORE     Referring       Unavailable  
   
                                [facility]    Unavail  
able  
   
                                CRYSTAL SAAB Attending       Unavailable  
   
                                CRYSTAL SAAB Attending       Unavailable  
   
                                CRYSTAL SAAB Attending       Unavailable  
   
                                CRYSTAL SAAB Attending       Unavailable  
   
                                CRYSTAL SAAB Referring       Unavailable  
   
                                STACY TELLES    Attending       Unavailable  
  
  
  
Allergies  
  
  
                                                    Allergy   
Classification                          Reported   
Allergen(s)               Allergy Type              Date of   
Onset                     Reaction(s)               Facility  
   
                                                      
(4 sources)                             Wheat gluten   
extract                   Drug Allergy                
2                                       Headaches,   
Swelling                                [facility]  
   
                                                      
(1 source)                              Lactase /   
Lactobacillus   
acidophilus               Drug Allergy                
3                                       Diarrhea,   
Itching,   
Swelling                                [facility]  
  
  
  
Medications  
Current Medications  
  
  
  
                      Medication Drug Class(es) Dates      Sig (Normalized) Sig   
(Original)  
   
                                                    Bisacodyl  
(4 sources)                             Stimulant   
Laxative                                                    Bisacodyl (LAXATIVE   
PO) Take by mouth   
OTC 0 Active  
   
                                                    levothyroxine sodium   
0.025 mg oral tablet  
(1 source)                l-Thyroxine               Start:   
2023                              take 1 tablet by   
mouth once daily                        levothyroxine   
(SYNTHROID) 25 MCG   
tablet Indications:   
Hypothyroidism due   
to Hashimoto's   
thyroiditis TAKE 1   
TABLET BY MOUTH   
EVERY DAY 30 tablet   
0 2023 Active  
   
                                                    24 hr metFORMIN   
hydrochloride 500 mg   
extended release   
oral tablet  
(3 sources)               Biguanide                 Start:   
10-                              take 2 tablets by   
mouth twice daily                       metFORMIN   
(GLUCOPHAGE-XR) 500   
MG extended release   
tablet Indications:   
PCOS (polycystic   
ovarian syndrome)   
TAKE 2 TABLETS BY   
MOUTH TWICE A DAY   
120 tablet 3   
10/21/2022 Active  
  
  
  
                                        Start: 2022   take 2 tablets by mo  
uth twice   
daily                                   metFORMIN (GLUCOPHAGE-XR) 500 MG   
extended release tablet Indications:   
PCOS (polycystic ovarian syndrome)   
TAKE 2 TABLETS BY MOUTH TWICE A DAY   
120 tablet 2 2022 Active  
  
  
  
                                                    Multiple Vitamin (MULTIVITAM  
IN   
PO)  
(4 sources)                                                     Multiple Vitamin  
 (MULTIVITAMIN   
PO) Take by mouth 0 Active  
   
                                                    Prenatal Vit-Fe Fumarate-FA   
(PRENATAL COMPLETE) 14-0.4 MG   
TABS  
(1 source)                                                      Prenatal Vit-Fe   
Fumarate-FA   
(PRENATAL COMPLETE) 14-0.4 MG   
TABS Take by mouth 0 Active  
   
                                                    wheat dextrin 3000 mg powder  
 for   
oral solution  
(3 sources)                                         Start: 2022  
End: 2022                                     Wheat Dextrin (BENEFIBER) PO  
WD   
Indications: Constipation,   
unspecified constipation type   
Take 4 g by mouth in the   
morning and 4 g at noon and 4 g   
in the evening. Take with   
meals. 500 g 0 2022 Active  
  
  
  
                                                            take 4 g by mouth th  
ree times daily at   
mealtime                                Wheat Dextrin (BENEFIBER) POWD Take 4 g   
by   
mouth 3 times daily (with meals) 0 Active  
  
  
  
  
  
Problems  
Active Problems  
  
  
                                                    Problem   
Classification  Problem         Date            Documented Date Episodic/Chronic  
   
                                                    Menstrual disorders  
(4 sources)                             Irregular   
menstruation,   
unspecified;   
Translations:   
[IRREGULAR   
MENSTRUATION   
UNSPECIFIED]                            Onset:   
2021                                          Chronic  
   
                                                    Mood disorders  
(3 sources)                             Recurrent major   
depressive episodes,   
moderate ;   
Translations: [Major   
depressive disorder,   
recurrent, moderate]                    Onset:   
2022                Chronic  
   
                                                    Other endocrine   
disorders  
(5 sources)                             Polycystic ovary   
syndrome;   
Translations:   
[Polycystic ovarian   
syndrome]                               Onset:   
2022                                          Chronic  
   
                                                    Other endocrine   
disorders  
(1 source)                              Polycystic ovarian   
syndrome;   
Translations:   
[Polycystic ovarian   
syndrome]                               Onset:   
2022                                          Chronic  
   
                                                    Other nutritional;   
endocrine; and   
metabolic disorders  
(4 sources)                             Body mass index 30+ -   
obesity;   
Translations: [Body   
mass index (BMI)   
37.0-37.9, adult]                       Onset:   
2022                Chronic  
   
                                                    Thyroid disorders  
(11 sources)                            Hypothyroidism due to   
Hashimoto's   
thyroiditis;   
Translations: [Other   
specified   
hypothyroidism]                         Onset:   
2022                Chronic  
  
  
Past or Other Problems  
  
  
                      Problem Classification Problem    Date       Documented Da  
te Episodic/Chronic  
   
                                                    Diabetes mellitus   
without complication  
(4 sources)                             Prediabetes;   
Translations:   
[Prediabetes]                           Onset:   
2022                Episodic  
   
                                                    Other nutritional;   
endocrine; and   
metabolic disorders  
(4 sources)                             Weight gain;   
Translations:   
[Abnormal weight   
gain]                                   Onset:   
2022                Episodic  
   
                                                    Other screening for   
suspected conditions   
(not mental disorders   
or infectious disease)  
(4 sources)                             Encounter for   
screening for   
malignant   
neoplasm of   
cervix;   
Translations:   
[ENC SCREENING   
MALIG NEOPLASM   
CERV]                                   Onset:   
2021                                          Episodic  
  
  
  
Results  
  
  
                      Test Name  Value      Interpretation Reference Range Facil  
ity  
  
  
  
                                                    US OB FOLLOW UP TRANSABDOMIN  
AL APPROACHon 11-   
  
  
  
                                                    US OB FOLLOW UP TRANSABDOMIN  
AL   
APPROACH                                This is a summary report. The   
complete report is available in   
the patient's medical record. If   
you cannot access the medical   
record, please contact the   
sending organization for a   
detailed fax or copy.  
EXAM: US OB FOLLOW UP   
TRANSABDOMINAL APPROACH  
DATE: 11/10/2023 10:54 AM  
COMPARISON: 11/3/2023  
Transabdominal ultrasound of the   
gravid uterus was performed.  
FINDINGS:  
A single live intrauterine   
pregnancy is noted in cephalic   
position.  
Fetal cardiac activity is   
measured at 138 bpm.  
The cervix appears closed   
measuring approximately 5.8 cm   
in longitudinal length.  
A grade 1-appearing placenta is   
anterior without evidence of   
placenta previa.  
The amniotic fluid index   
measures approximately 14.73 cm   
measured in 4 quadrants, which   
is within normal limits for   
gestation. 66th percentile  
MEASUREMENTS: BPD 8.9 cm, FL 7.3   
cm, AC 33.4 cm, HC 32.3 cm,   
which corresponds to 36 weeks 6   
days, +/-3 weeks.  
ESTIMATED FETAL WEIGHT: 3128 g g   
(6 lbs. 14 oz.), which is 18th   
percentile for gestation by LMP.  
No gross fetal anatomic   
abnormalities are identified,   
within limits of the advanced   
fetal gestational age.  
IMPRESSION:  
SINGLE LIVE INTRAUTERINE   
PREGNANCY CORRESPONDING TO   
APPROXIMATELY 36 weeks 6 days,   
+/-3 weeks.  
APPROPRIATE GROWTH SINCE   
11/3/2023.  
NO GROSS ABNORMALITY IDENTIFIED,   
WITHIN THE LIMITS OF THE STUDY.  
ELECTRONICALLY SIGNED BY: Edwardo De Luna MD          Normal                                  Not Available  
  
  
  
                                                    US OB FOLLOW UP TRANSABDOMIN  
AL APPROACHon 2023   
  
  
  
                                                    US OB FOLLOW UP TRANSABDOMIN  
AL   
APPROACH                                US OB FOLLOW UP TRANSABDOMINAL   
APPROACH: 11/3/2023 9:21 AM  
CLINICAL HISTORY: Growth  
COMPARISON: 2023  
Transabdominal ultrasound of the   
gravid uterus was performed.  
FINDINGS:  
A single live intrauterine   
pregnancy is noted in vertex   
position.  
Fetal cardiac activity measures   
approximately 128 beats per   
minute.  
The lower uterine segment and   
cervix are seen, and appear   
within normal limits.  
The cervix measures   
approximately 4.6 cm in   
longitudinal length.  
A grade 2-appearing placenta is   
anterior without evidence of an   
abnormal subplacental collection   
or previa.  
The amniotic fluid (16.43 cm)   
within normal limits for   
gestation.  
The following measurements were   
obtained: BPD 8.92 cm, HC 31.97   
cm, AC 32.90 cm, FL 7.13 cm,   
which corresponds to an   
aggregate gestational age of 36   
weeks 5 days.  
Estimated fetal weight is 3055 g   
which is in the 23.6 percentile.  
Single intrauterine pregnancy is   
seen in cephalic presentation   
with heart motion.  
IMPRESSION:  
SINGLE LIVE INTRAUTERINE   
PREGNANCY CORRESPONDING TO   
APPROXIMATELY 36 WEEKS 5 DAYS   
WITH AN EXPECTED DUE DATE OF   
2023.  
NO GROSS ABNORMALITIES   
IDENTIFIED, WITHIN THE LIMITS OF   
THE STUDY.  
ELECTRONICALLY SIGNED BY:   
Kyara Ledesma DO Normal                                  Not Available  
  
  
  
                                                    US OB FOLLOW UP TRANSABDOMIN  
AL APPROACHon 10-   
  
  
  
                                                    US OB FOLLOW UP TRANSABDOMIN  
AL   
APPROACH                                HISTORY: Supervision of   
pregnancy. Interval follow-up.   
Age by LMP of 37 weeks 5 days.  
COMPARISON: 10/20/2023.  
TECHNIQUE: Sonography of the   
pelvis was performed by   
transabdominal technique. Images   
were obtained and stored in a   
permanent archive.  
RESULT:  
Gestation: Single present.  
Position: Cephalic  
BPD: 8.9 cm  
HC: 31.9 cm  
AC: 31.6 cm  
FL: 6.9 cm  
Anatomy: No gross anomalies in   
the visualized fetal anatomy.  
Cardiac activity: 139 bpm  
Estimated fetal weight (EFW):   
2724 g (6 pounds 0 ounces),   
14.2%  
Estimated gestational age: 35   
weeks 5 days estimated   
gestational age by composite.  
Cervix: Not visualized secondary   
to bowel gas, empty bladder,   
fetal position.  
Placenta:  
Location: Anterior  
Grade: 2  
Previa: absent  
Amniotic fluid: 15.7 cm, 64th   
percentile  
IMPRESSION:  
Single, live intrauterine   
pregnancy with estimated 35   
weeks 5 days gestational age.  
Size measures less than dating   
by LMP of 37 weeks 5 days.  
ELECTRONICALLY SIGNED BY: Rodrigo Earl MD         Normal                                  Not Available  
  
  
  
                                                    No Panel Informationon    
  
  
  
                                                                  [facility]  
  
  
  
                                                    T4, Freeon 2023   
  
  
  
                      Free T4 [Mass/Vol] 1.56 ng/dL            0.93 - 1.70 ng/dL  
 [facility]  
  
  
  
                                                    TSHon 2023   
  
  
  
                                                    Interpretation and review of  
 laboratory   
results         Abnormal                                        [facility]  
   
                      TSH Qn     0.03 m[IU]/L Low                   [facility]  
  
  
  
                                                    Thyroid Stim. Horm.on 2023   
  
  
  
                      Thyroid Stim. Horm. 0.03 uIU/mL Low        0.30-5.00  [facility]  
  
  
  
                                        Comment on above:   Performed By: #### T  
SH, FT4 ####  
[facility]  
[facility] [place] [id number] [phone]  
: Jack Hays MD                                                
                                                                                
                                                                                
                                                                                
                                                                                
                                                                                
                                                                                
                                                                                
                                                                                
                                                                                
                                                                                
                                            
  
  
  
                                                    Thyroxine, Freeon 2023  
   
  
  
  
                      Thyroxine, Free 1.56 ng/dL Normal     0.93-1.70  [facility]  
  
  
  
                                        Comment on above:   Performed By: #### T  
SH, FT4 ####  
[facility]  
[address] [place] [place] [id number] [phone]  
: Jack Hays MD                                                
                                                                                
                                                                                
                                                                                
                                                                                
                                                                                
                                                                                
                                                                                
                                                                                
                                                                                
                                                                                
                                            
  
  
  
                                                    Basic Metabolic Panelon 09-2  
   
  
  
  
                      Anion gap [Moles/Vol] 12 mmol/L             9 - 17 mmol/L   
[facility]  
   
                      Calcium [Mass/Vol] 9.0 mg/dL             8.6 - 10.4 mg/dL   
[facility]  
   
                      Chloride [Moles/Vol] 102 mmol/L            98 - 107 mmol/L  
 [facility]  
   
                      CO2 [Moles/Vol] 23 mmol/L             20 - 31 mmol/L [facility]  
[facility]  
   
                      Creatinine [Mass/Vol] 0.63 mg/dL            0.5 - 0.9 mg/d  
L [facility]  
   
                      GFR  >60                   60 - PINF mL/mi  
n [facility]  
   
                      GFR Non- >60                   60 - PINF m  
L/min [facility]  
   
                                                    GFR/1.73 sq M.predicted MDRD  
   
(S/P/Bld) [Vol rate/Area]                                                 [facility]  
  
  
  
                                        Comment on above:   Average GFR for 30-3  
9 years old:  
107 mL/min/1.73sq m  
Chronic Kidney Disease:  
<60 mL/min/1.73sq m  
Kidney failure:  
<15 mL/min/1.73sq m  
  
  
eGFR calculated using average adult body mass. Additional eGFR calculator 
available at:  
  
http://www.[employer]/D[employer] Expert_crcl_.htm  
  
  
                                                                                
                                                                                
                                                                                
                                                                                
                                                                                
                                                                                
                                                                                
                                                                                
                                                                                
                                                                                
                                                                                  
  
  
  
                      Glucose [Mass/Vol] 75 mg/dL              70 - 99 mg/dL [facility]  
   
                      Potassium [Moles/Vol] 3.9 mmol/L            3.7 - 5.3 mmol  
/L [facility]  
   
                      Sodium [Moles/Vol] 137 mmol/L            135 - 144 mmol/L   
[facility]  
   
                                                    Urea nitrogen (BldV)   
[Mass/Vol]      9 mg/dL                         6 - 20 mg/dL    [facility]  
   
                                                                  [facility]  
  
  
  
                                                    Basic Metabolic Profon    
  
  
  
                      (cont.)               Normal                [facility]  
  
  
  
                                        Comment on above:   Result Comment: Aver  
age GFR for 30-39 years old:  
107 mL/min/1.73sq m  
Chronic Kidney Disease:  
<60 mL/min/1.73sq m  
Kidney failure:  
<15 mL/min/1.73sq m  
eGFR calculated using average adult body mass. Additional eGFR calculator  
available at:  
http://www.[employer]/D[employer] Expert_crcl_.htm                                 
                                                                                
                                                                                
                                                                                
                                                                                
                                                                                
                                                                                
                                                                                
                                                                                
                                                                                
                                                                                
                                                           
   
                                                            Performed By: #### B  
MP ####  
[facility]  
[address] [place] [address]  
[phone]  
: Jack Hays MD                                                
                                                                                
                                                                                
                                                                                
                                                                                
                                                                                
                                                                                
                                                                                
                                                                                
                                                                                
                                                                                
                                            
  
  
  
                      Anion gap [Moles/Vol] 12 mmol/L  Normal     9-17       [facility]  
  
  
  
                                        Comment on above:   Performed By: #### B  
MP ####  
[facility] [employer]  
[address] [place] [address]  
[phone]  
: Jack Hays MD                                                
                                                                                
                                                                                
                                                                                
                                                                                
                                                                                
                                                                                
                                                                                
                                                                                
                                                                                
                                                                                
                                            
  
  
  
                      Calcium [Mass/Vol] 9.0 mg/dL  Normal     8.6-10.4   [facility]  
  
  
  
                                        Comment on above:   Performed By: #### B  
MP ####  
[facility]  
[address] [place] [address]  
[phone]  
: Jack Hays MD                                                
                                                                                
                                                                                
                                                                                
                                                                                
                                                                                
                                                                                
                                                                                
                                                                                
                                                                                
                                                                                
                                            
  
  
  
                      Chloride [Moles/Vol] 102 mmol/L Normal          [facility]  
  
  
  
                                        Comment on above:   Performed By: #### B  
MP ####  
[address] [address]  
[phone]  
: Jack Hays MD                                                
                                                                                
                                                                                
                                                                                
                                                                                
                                                                                
                                                                                
                                                                                
                                                                                
                                                                                
                                                                                
                                            
  
  
  
                      CO2 [Moles/Vol] 23 mmol/L  Normal     20-31      [facility]  
  
  
  
                                        Comment on above:   Performed By: #### B  
MP ####  
[address] [address]  
[phone]  
: Jack Hays MD                                                
                                                                                
                                                                                
                                                                                
                                                                                
                                                                                
                                                                                
                                                                                
                                                                                
                                                                                
                                                                                
                                            
  
  
  
                      Creatinine [Mass/Vol] 0.63 mg/dL Normal     0.50-0.90  [facility]  
  
  
  
                                        Comment on above:   Performed By: #### B  
MP ####  
[address] [address]  
[phone]  
: Jack Hays MD                                                
                                                                                
                                                                                
                                                                                
                                                                                
                                                                                
                                                                                
                                                                                
                                                                                
                                                                                
                                                                                
                                            
  
  
  
                      GFR, Amer >60        Normal     >60        [facility]  
  
  
  
                                        Comment on above:   Performed By: #### B  
MP ####  
[facility] [employer]  
[address] [place] [address]  
[phone]  
: Jack Hays MD                                                
                                                                                
                                                                                
                                                                                
                                                                                
                                                                                
                                                                                
                                                                                
                                                                                
                                                                                
                                                                                
                                            
  
  
  
                      GFR,non  Amer >60        Normal     >60        [facility]  
  
  
  
                                        Comment on above:   Performed By: #### B  
MP ####  
[address] [address]  
[phone]  
: Jack Hays MD                                                
                                                                                
                                                                                
                                                                                
                                                                                
                                                                                
                                                                                
                                                                                
                                                                                
                                                                                
                                                                                
                                            
  
  
  
                      Glucose [Mass/Vol] 75 mg/dL   Normal     70-99      [facility]  
  
  
  
                                        Comment on above:   Performed By: #### B  
MP ####  
[address] [place] [address]  
[phone]  
: Jack Hays MD                                                
                                                                                
                                                                                
                                                                                
                                                                                
                                                                                
                                                                                
                                                                                
                                                                                
                                                                                
                                                                                
                                            
  
  
  
                      Potassium [Moles/Vol] 3.9 mmol/L Normal     3.7-5.3    [facility]  
  
  
  
                                        Comment on above:   Performed By: #### B  
MP ####  
[address] [address]  
[phone]  
: Jack Hays MD                                                
                                                                                
                                                                                
                                                                                
                                                                                
                                                                                
                                                                                
                                                                                
                                                                                
                                                                                
                                                                                
                                            
  
  
  
                      Sodium [Moles/Vol] 137 mmol/L Normal     135-144    [facility]  
  
  
  
                                        Comment on above:   Performed By: #### B  
MP ####  
[facility] [employer]  
[address] [place] [address]  
[phone]  
: Jack Hays MD                                                
                                                                                
                                                                                
                                                                                
                                                                                
                                                                                
                                                                                
                                                                                
                                                                                
                                                                                
                                                                                
                                            
  
  
  
                      Urea nitrogen [Mass/Vol] 9 mg/dL    Normal     6-20         
[facility]  
  
  
  
                                        Comment on above:   Performed By: #### B  
MP ####  
[address] [address]  
[phone]  
: Jack Hays MD                                                
                                                                                
                                                                                
                                                                                
                                                                                
                                                                                
                                                                                
                                                                                
                                                                                
                                                                                
                                                                                
                                            
  
  
  
                                                    US HEAD NECK SOFT TISSUE THY  
[facility] 2022   
  
  
  
                                                    US HEAD NECK SOFT   
TISSUE THYROID                          EXAMINATION:  
THYROID ULTRASOUND  
2022 10:52 am  
COMPARISON:  
None  
HISTORY:  
ORDERING SYSTEM PROVIDED   
HISTORY: Goiter  
TECHNOLOGIST PROVIDED   
HISTORY:  
This procedure can be   
scheduled via [employer]. Access   
your [employer] account by  
visiting [email or web address].  
FINDINGS:  
Right thyroid lobe: 4.2 cm x   
1.4 cm x 1.7 cm  
Left thyroid lobe: 4.1 cm x   
1.3 cm x 1.5 cm  
Isthmus: 0.3 cm  
THYROID GLAND: Normal size.   
Mildly heterogeneous   
echogenicity and  
echotexture. Decreased   
vascularity.  
NODULES: None visualized.  
CERVICAL LYMPHADENOPATHY:   
None visualized.  
IMPRESSION:  
Heterogeneous, hypovascular   
appearance of thyroid   
parenchyma likely due to  
chronic changes of   
thyroiditis.  
Interpreted by:  
Bart Lagunas MD  
Signed by:  
Bart Lagunas MD  
22  
Final result        Normal                                  [facility]  
   
                                                            Heterogeneous, hypov  
ascular   
appearance of thyroid   
parenchyma likely due to  
chronic changes of   
thyroiditis.  
                                                            [facility]  
D  
   
                                                            EXAMINATION:  
THYROID ULTRASOUND  
  
2022 10:52 am  
  
COMPARISON:  
None  
  
HISTORY:  
ORDERING SYSTEM PROVIDED   
HISTORY: Goiter  
TECHNOLOGIST PROVIDED   
HISTORY:  
This procedure can be   
scheduled via [employer]. Access   
your [employer] account by  
visiting [email or web address].  
  
FINDINGS:  
Right thyroid lobe: 4.2 cm x   
1.4 cm x 1.7 cm  
  
Left thyroid lobe: 4.1 cm x   
1.3 cm x 1.5 cm  
  
Isthmus: 0.3 cm  
  
THYROID GLAND: Normal size.   
Mildly heterogeneous   
echogenicity and  
echotexture. Decreased   
vascularity.  
  
NODULES: None visualized.  
  
CERVICAL LYMPHADENOPATHY:   
None visualized.  
                                                            [facility]  
D  
   
                                                            Bart Lagunas MD  
 -   
2022 Formatting of this   
note might be different from   
the original.  
EXAMINATION:  
THYROID ULTRASOUND  
  
2022 10:52 am  
  
COMPARISON:  
None  
  
HISTORY:  
ORDERING SYSTEM PROVIDED   
HISTORY: Goiter  
TECHNOLOGIST PROVIDED   
HISTORY:  
This procedure can be   
scheduled via [employer]. Access   
your [employer] account by  
visiting [email or web address].  
  
FINDINGS:  
Right thyroid lobe: 4.2 cm x   
1.4 cm x 1.7 cm  
  
Left thyroid lobe: 4.1 cm x   
1.3 cm x 1.5 cm  
  
Isthmus: 0.3 cm  
  
THYROID GLAND: Normal size.   
Mildly heterogeneous   
echogenicity and  
echotexture. Decreased   
vascularity.  
  
NODULES: None visualized.  
  
CERVICAL LYMPHADENOPATHY:   
None visualized.  
  
IMPRESSION:  
Heterogeneous, hypovascular   
appearance of thyroid   
parenchyma likely due to  
chronic changes of   
thyroiditis.  
  
                                                            [facility]  
Work Phone:   
[phone]  
   
                                                    Radiology Study   
observation (narrative)                                                 TRUONG SECJAYSON  
[facility] [email or web address]  
Work Phone:   
[phone]  
  
  
  
                                                    US HEAD NECK SOFT TISSUE THY  
ROIDOrdered By: Bart Lagunas on 2022   
  
  
  
                                                                  TRUONG HAWKINS [email or web address] [email or web address]  
Work Phone: [phone]  
  
  
  
                                                    US HEAD NECK SOFT TISSUE THY  
ROIDon 2021   
  
  
  
                                                    US HEAD NECK SOFT   
TISSUE THYROID                          EXAMINATION: US HEAD NECK   
SOFT TISSUE THYROID  
CLINICAL HISTORY: GOITER.  
FINDINGS:  
Right lobe measures 4.7 x 1.6   
x 1.6 cm.  
Left lobe measures 4.3 x 1.3   
x 1.4 cm.  
Isthmus measures 3.1 mm.  
Right lobe demonstrates no   
discrete cystic or solid   
lesions.  
In the left lobe, posteriorly   
in the lower pole, a 4.8 x   
3.0 x 4.6 mm nearly   
completely solid, isoechoic,   
wider than tall, ill-defined,   
noncalcified nodule is   
identified.  
IMPRESSION:  
CATEGORY 3: MILDLY   
SUSPICIOUS. NO BIOPSY   
REQUIRED. FOLLOW-UP IMAGING   
IF GREATER THAN OR EQUAL TO   
1.5 CM. LEFT LOBE, LOWER   
POLE.  
Interpreted by:  
Torsten Lebron MD  
Signed by:  
Torsten Lebron MD  
12/3/21  
Final result        Normal                                  [facility]  
  
  
  
                                                    TSH w/Reflexon 10-   
  
  
  
                      TSH w/Reflex 1.780 uIU/mL Normal     0.440-3.86 [facility]  
  
  
  
                                        Comment on above:   Performed By: #### T  
SHR ####  
[facility]  
[address] [address]  
[phone]                                                                    
                                                                                
                                                                                
                                                                                
                                                                                
                                                                                
                                                                                
                                                                                
                                                                                
                                                                                
                                                                                
                        
  
  
  
                                                    Thyroxine Freeon 10-   
  
  
  
                      Thyroxine Free 1.15 ng/dL Normal     0.84-1.68  [facility]  
  
  
  
                                        Comment on above:   Performed By: #### F  
RT4 ####  
[facility]  
[address] [facility] [address]  
[phone]                                                                    
                                                                                
                                                                                
                                                                                
                                                                                
                                                                                
                                                                                
                                                                                
                                                                                
                                                                                
                                                                                
                        
  
  
  
                                                    Testosterone Free and Total   
by LC-MS/MSon 10-   
  
  
  
                      Sex Hormone Binding Globulin 37 nmol/L  Normal        
    [facility]  
  
  
  
                                        Comment on above:   Result Comment: REFE  
RENCE INTERVAL: Sex Hormone Binding   
Globulin  
Access complete set of age- and/or gender-specific reference intervals  
for  
this test in the [facility] Directory ([email or web address]).                  
                                                                                
                                                                                
                                                                                
                                                                                
                                                                                
                                                                                
                                                                                
                                                                                
                                                                                
                                                                                
                                                                          
  
  
  
                      Testosterone, Free LC-MS/MS 3.3 pg/mL  Normal     1.3-9.2   
   [facility]  
  
  
  
                                        Comment on above:   Result Comment: To c  
onvert to pmol/L, multiply pg/mL by 3.47  
The concentration of Free Testosterone is derived from a mathematical  
expression based on the constant for the binding of testosterone to sex  
hormone binding globulin.  
Testosterone, Free LC-MS/MS Reference Interval for Females 18 years and  
older  
Postmenopausal: 0.6 - 3.8 pg/mL  
REFERENCE INTERVAL: Testosterone, Free LC-MS/MS  
Access complete set of age- and/or gender-specific reference intervals  
for  
this test in the [facility] Directory ([employer]).  
This test was developed and its performance characteristics determined  
by  
[facility]. It has not been cleared or approved by the [place] Food  
and  
Drug Administration. This test was performed in a CLIA certified  
laboratory  
and is intended for clinical purposes.  
Performed By: [facility]  
[address] [place] [address]  
: Keyla Hodges MD                                        
                                                                                
                                                                                
                                                                                
                                                                                
                                                                                
                                                                                
                                                                                
                                                                                
                                                                                
                                                                                
                                                    
  
  
  
                      Testosterone, LC-MS/MS 21 ng/dL   Normal     9-55       [facility]  
  
  
  
                                        Comment on above:   Result Comment: Tota  
l Testosterone, Females 18 years and older  
Premenopausal 9-55 ng/dL  
Postmenopausal 5-32 ng/dL  
REFERENCE INTERVAL: Testosterone, LC-MS/MS  
Access complete set of age- and/or gender-specific reference intervals  
for  
this test in the [facility] Test Directory ([employer]).  
This test was developed and its performance characteristics determined  
by  
[employer]. It has not been cleared or approved by the US Food  
and  
Drug Administration. This test was performed in a CLIA certified  
laboratory  
and is intended for clinical purposes.                                          
                                                                                
                                                                                
                                                                                
                                                                                
                                                                                
                                                                                
                                                                                
                                                                                
                                                                                
                                                                                
                                                  
  
  
  
                                                    Thyroid Peroxidase(TPO) Abon  
 10-   
  
  
  
                      Thyroid Peroxidase(TPO) Ab 205.0 IU/mL Critically high 0.0  
-25.0   [facility]  
  
  
  
                                        Comment on above:   Result Comment:  
Reference Range:  
<25.0 Negative  
25.0-35.0 Equivocal  
>35.0 Positive  
When results are Equivocal, it is recommended to retest after 8-12  
weeks.  
[id number] [place] [address] [phone]               
                                                                                
                                                                                
                                                                                
                                                                                
                                                                                
                                                                                
                                                                                
                                                                                
                                                                                
                                                                                
                                                                             
  
  
  
                                                    Basic Metabolic Panelon 09-2  
   
  
  
  
                      Anion gap [Moles/Vol] 12 mmol/L  Normal     9-15       [facility]  
  
  
  
                                        Comment on above:   Performed By: #### B  
MP ####  
[facility]  
[address]  
[place] [address]  
[phone]                                                                    
                                                                                
                                                                                
                                                                                
                                                                                
                                                                                
                                                                                
                                                                                
                                                                                
                                                                                
                                                                                
                        
  
  
  
                      Calcium [Mass/Vol] 9.3 mg/dL  Normal     8.5-9.9    [facility]  
  
  
  
                                        Comment on above:   Performed By: #### B  
MP ####  
[facility]  
[address]  
[place] [address]  
[phone]                                                                    
                                                                                
                                                                                
                                                                                
                                                                                
                                                                                
                                                                                
                                                                                
                                                                                
                                                                                
                                                                                
                        
  
  
  
                      Chloride [Moles/Vol] 100 mmol/L Normal          [facility]  
  
  
  
                                        Comment on above:   Performed By: #### B  
MP ####  
[facility]  
[address]  
[place] [address]  
[phone]                                                                    
                                                                                
                                                                                
                                                                                
                                                                                
                                                                                
                                                                                
                                                                                
                                                                                
                                                                                
                                                                                
                        
  
  
  
                      CO2 [Moles/Vol] 27 mmol/L  Normal     20-31      [facility]  
  
  
  
                                        Comment on above:   Performed By: #### B  
MP ####  
[facility]  
[address] [place] [address]  
[phone]                                                                    
                                                                                
                                                                                
                                                                                
                                                                                
                                                                                
                                                                                
                                                                                
                                                                                
                                                                                
                                                                                
                        
  
  
  
                      Creatinine [Mass/Vol] 0.67 mg/dL Normal     0.50-0.90  [facility]  
  
  
  
                                        Comment on above:   Performed By: #### B  
MP ####  
[facility]  
[address] [place] [address]  
[phone]                                                                    
                                                                                
                                                                                
                                                                                
                                                                                
                                                                                
                                                                                
                                                                                
                                                                                
                                                                                
                                                                                
                        
  
  
  
                      GFR        >60.0      Normal     >60        [facility]  
  
  
  
                                        Comment on above:   Result Comment: >60   
mL/min/1.73m2 EGFR, calc. for ages 18 and   
older using the  
MDRD formula (not corrected for weight), is valid for stable  
renal function.                                                                 
                                                                                
                                                                                
                                                                                
                                                                                
                                                                                
                                                                                
                                                                                
                                                                                
                                                                                
                                                                                
                           
   
                                                            Performed By: #### B  
MP ####  
[facility]  
[address] [place] [address]  
[phone]                                                                    
                                                                                
                                                                                
                                                                                
                                                                                
                                                                                
                                                                                
                                                                                
                                                                                
                                                                                
                                                                                
                        
  
  
  
                                                    GFR/1.73 sq M.predicted janet  
g   
blacks MDRD (S/P/Bld) [Vol   
rate/Area]      mL/min/{1.73_m2} Normal          >60             [facility]  
  
  
  
                                        Comment on above:   Result Comment: >60   
mL/min/1.73m2 EGFR, calc. for ages 18 and   
older using the  
MDRD formula (not corrected for weight), is valid for stable  
renal function.                                                                 
                                                                                
                                                                                
                                                                                
                                                                                
                                                                                
                                                                                
                                                                                
                                                                                
                                                                                
                                                                                
                
   
                                                            Performed By: #### B  
MP ####  
[facility]  
[address] [place] [address]  
[phone]                                                                    
                                                                                
                                                                                
                                                                                
                                                                                
                                                                                
                                                                                
                                                                                
                                                                                
                                                                                
                                                                                
             
  
  
  
                      Glucose [Mass/Vol] 84 mg/dL   Normal     70-99      [facility]  
  
  
  
                                        Comment on above:   Performed By: #### B  
MP ####  
[facility]  
[address] [place] [address]  
[phone]                                                                    
                                                                                
                                                                                
                                                                                
                                                                                
                                                                                
                                                                                
                                                                                
                                                                                
                                                                                
                                                                                
                        
  
  
  
                      Potassium [Moles/Vol] 4.3 mmol/L Normal     3.4-4.9    [facility]  
  
  
  
                                        Comment on above:   Performed By: #### B  
MP ####  
[facility]  
[address] [place] [address]  
[phone]                                                                    
                                                                                
                                                                                
                                                                                
                                                                                
                                                                                
                                                                                
                                                                                
                                                                                
                                                                                
                                                                                
                        
  
  
  
                      Sodium [Moles/Vol] 139 mmol/L Normal     135-144    [facility]  
  
  
  
                                        Comment on above:   Performed By: #### B  
MP ####  
[facility]  
[address] [place] [address]  
[phone]                                                                    
                                                                                
                                                                                
                                                                                
                                                                                
                                                                                
                                                                                
                                                                                
                                                                                
                                                                                
                                                                                
                        
  
  
  
                      Urea nitrogen [Mass/Vol] 10 mg/dL   Normal     6-20         
[facility]  
  
  
  
                                        Comment on above:   Performed By: #### B  
MP ####  
[facility]  
[address] [place] [address]  
[phone]                                                                    
                                                                                
                                                                                
                                                                                
                                                                                
                                                                                
                                                                                
                                                                                
                                                                                
                                                                                
                                                                                
                        
  
  
  
                                                    Insulinon 2021   
  
  
  
                      Insulin    6.6 uIU/mL Normal     2.6-24.9   [facility]  
  
  
  
                                        Comment on above:   Result Comment:  
NOTE: to convert Insulin to pmol/L, multiply uIU/mL by 6.9                      
                                                                                
                                                                                
                                                                                
                                                                                
                                                                                
                                                                                
                                                                                
                                                                                
                                                                                
                                                                                
                                                                      
   
                                                            Performed By: #### I  
NSUL ####  
[facility]  
[address] [place] [address]  
[phone]                                                                    
                                                                                
                                                                                
                                                                                
                                                                                
                                                                                
                                                                                
                                                                                
                                                                                
                                                                                
                                                                                
                        
  
  
  
                                                    US PELVIS AND TRANSVAGon    
  
  
  
                                        US PELVIS AND TRANSVAG EXAMINATION: US P  
GIA AND   
TRANSVAG  
HISTORY: Irregular periods  
COMPARISON: No relevant   
comparison available.  
TECHNIQUE: Transabdominal and   
transvaginal sonographic   
examination.  
FINDINGS:  
UTERUS: Normal size and   
appearance. Uterus size: 8.5 x   
5.3 x 4.5 cm.  
ENDOMETRIUM: Normal homogeneous   
appearance. Endometrial   
thickness: 7 mm  
RIGHT OVARY: Contains a 1.8 cm   
dominant follicle. Duplex   
Doppler demonstrates  
normal waveform and flow;   
resistive index 0.47. Ovary   
size: 3.6 x 2.3 x 2.1 cm  
LEFT OVARY: Normal size and   
appearance. Duplex Doppler   
demonstrates normal  
waveform and flow; resistive   
index 0.46. Ovary size: 2.8 x   
2.2 x 1.6 cm  
CUL-DE-SAC: Unremarkable. No   
significant free fluid.  
BLADDER: Unremarkable.  
OTHER: None.  
IMPRESSION:  
1. No abnormal or suspicious   
findings to account for   
patient's symptoms.  
Electronically authenticated   
by: EDWARDO KIM Date:   
2021 13:11    Normal                                  [facility]  
l  
  
  
  
                                                    Pap IG, rfx Aptima HPV, rfx   
16/18,45on 2021   
  
  
  
                      .          .          Normal                [facility]  
ospital  
  
  
  
                                        Comment on above:   Result Comment: Perf  
ormed at: [facility]                                
                                                                                
                                                                                
                                                                                
                                                                                
                                                                                
                                                                                
                                                                                
                                                                                
                                                                                
                                                                                
                                                              
   
                                                            Performed By: #### P  
APHR2A ####  
[facility]  
[address] [place] [address]  
[place]                                                                    
                                                                                
                                                                                
                                                                                
                                                                                
                                                                                
                                                                                
                                                                                
                                                                                
                                                                                
                                                                                
                        
  
  
  
                      DIAGNOSIS: Comment    Normal                [facility]  
ospital  
  
  
  
                                        Comment on above:   Result Comment: NEGA  
TIVE FOR INTRAEPITHELIAL LESION OR   
MALIGNANCY.  
Performed at: [facility]                                                                
                                                                                
                                                                                
                                                                                
                                                                                
                                                                                
                                                                                
                                                                                
                                                                                
                                                                                
                                                                                
                            
   
                                                            Performed By: #### P  
APHR2A ####  
[facility]  
[address] [place] [address]  
[place]                                                                    
                                                                                
                                                                                
                                                                                
                                                                                
                                                                                
                                                                                
                                                                                
                                                                                
                                                                                
                                                                                
                        
  
  
  
                      HPV Aptima Negative   Normal     Negative   [facility]  
[facility]  
  
  
  
                                        Comment on above:   Result Comment: This  
 nucleic acid amplification test detects   
fourteen high-risk  
HPV types (16,18,31,33,35,39,45,51,52,56,58,59,66,68) without  
differentiation.  
Performed at: [facility]                                                                
                                                                                
                                                                                
                                                                                
                                                                                
                                                                                
                                                                                
                                                                                
                                                                                
                                                                                
                                                                                
                            
   
                                                            Performed By: #### P  
APHR2A ####  
[facility]  
[address] [place] [address]  
[place]                                                                    
                                                                                
                                                                                
                                                                                
                                                                                
                                                                                
                                                                                
                                                                                
                                                                                
                                                                                
                                                                                
                        
  
  
  
                      Methodology: Comment    Normal                [facility]  
  
  
  
                                        Comment on above:   Result Comment: This  
 liquid based ThinPrep(R) pap test was   
screened with the  
use of an image guided system.  
Performed at: [facility]                                                                
                                                                                
                                                                                
                                                                                
                                                                                
                                                                                
                                                                                
                                                                                
                                                                                
                                                                                
                                                                                
                            
   
                                                            Performed By: #### P  
APHR2A ####  
[facility]  
[address] [place] [address]  
[place]                                                                    
                                                                                
                                                                                
                                                                                
                                                                                
                                                                                
                                                                                
                                                                                
                                                                                
                                                                                
                                                                                
                        
  
  
  
                      Note:      Comment    Normal                [facility]  
  
  
  
                                        Comment on above:   Result Comment: The   
Pap smear is a screening test designed to   
aid in the detection of  
premalignant and malignant conditions of the uterine cervix. It is not a  
diagnostic procedure and should not be used as the sole means of detecting  
cervical cancer. Both false-positive and false-negative reports do occur.  
.  
Performed at: [facility]                                                                
                                                                                
                                                                                
                                                                                
                                                                                
                                                                                
                                                                                
                                                                                
                                                                                
                                                                                
                                                                                
                            
   
                                                            Performed By: #### P  
APHR2A ####  
[facility]  
[address] [place] [address]  
[place]                                                                    
                                                                                
                                                                                
                                                                                
                                                                                
                                                                                
                                                                                
                                                                                
                                                                                
                                                                                
                                                                                
                        
  
  
  
                      Performed by: Comment    Normal                [facility]  
  
  
  
                                        Comment on above:   Result Comment: Jaclyn Cook, Cytotechnologist   
(ASCP)  
Performed at: [facility]                                                                
                                                                                
                                                                                
                                                                                
                                                                                
                                                                                
                                                                                
                                                                                
                                                                                
                                                                                
                                                                                
                            
   
                                                            Performed By: #### P  
APHR2A ####  
[facility]  
[address] [place] [address]  
[place]                                                                    
                                                                                
                                                                                
                                                                                
                                                                                
                                                                                
                                                                                
                                                                                
                                                                                
                                                                                
                                                                                
                        
  
  
  
                      Specimen adequacy: Comment    Normal                [facility]  
  
  
  
                                        Comment on above:   Result Comment: Sati  
sfactory for evaluation. No endocervical   
component is identified.  
Performed at: [facility]                                                                
                                                                                
                                                                                
                                                                                
                                                                                
                                                                                
                                                                                
                                                                                
                                                                                
                                                                                
                                                                                
                            
   
                                                            Performed By: #### P  
APHR2A ####  
[facility]  
[address] [place] [address]  
[place]                                                                    
                                                                                
                                                                                
                                                                                
                                                                                
                                                                                
                                                                                
                                                                                
                                                                                
                                                                                
                                                                                
                        
  
  
  
Encounters  
  
  
                          Encounter Date Encounter Type Care Provider Facility  
   
                                                    Start: 2023  
End: 2023     ambulatory          [facility]     Not Available  
   
                                                    Start: 2023  
End: 2023     ambulatory          [facility]     Not Available  
   
                                                    Start: 2023  
End: 2023     ambulatory          [facility]     Not Available  
   
                                                    Start: 2023  
End: 2023     ambulatory          [facility]         Not Available  
   
                                                    Start: 11-  
End: 2023     ambulatory          [facility]     Not Available  
   
                                                    Start: 2023  
End: 2023     ambulatory          [facility]     Not Available  
   
                                                    Start: 2023  
End: 2023     ambulatory          [facility]  
   
                                                    Start: 2023  
End: 2023                         Subsequent hospital   
visit by physician                      Adilene YOUNGBLOOD - CNP  
Work Phone:   
[phone]                          [facility]   
Draw  
   
                                                    Start: 2023  
End: 2023     ambulatory          [facility]  
   
                                                    Start: 2023  
End: 2023                         Subsequent hospital   
visit by physician                      Adilene YOUNGBLOOD - CNP  
Work Phone:   
[phone]                          [facility]   
Draw  
  
  
  
                                        Comment on above:   [facility]                
                                            
                                                                                
                                                                                
                                                                                
                                                                                
                                                                                
                                                                                
                                                                                
                                                                                
                                                                                
                                                                                
                                    
  
  
  
                                                    Start: 2022  
End: 2022     ambulatory          [facility]  
   
                                                    Start: 2022  
End: 2022                         Subsequent hospital   
visit by physician        Shahana Biggs [facility]      [facility]   
Draw  
  
  
  
                                        Comment on above:   PCOS (polycystic ova  
vaibhav syndrome)                              
                                                                                
                                                                                
                                                                                
                                                                                
                                                                                
                                                                                
                                                                                
                                                                                
                                                                                
                                                                                
                                                                
   
                                                            [facility]                
                                                             
                                                                                
                                                                                
                                                                                
                                                                                
                                                                                
                                                                                
                                                                                
                                                                                
                                                                                
                                                                                
                   
  
  
  
                                                    Start: 2021  
End: 2021     ambulatory          [facility]  
   
                                                    Start: 2021  
End: 2021     ambulatory          [facility]        Facility[facility]  
   
                                                    Start: 2021  
End: 2021     ambulatory          [facility]        Facility[facility]  
  
  
  
Procedures  
  
  
                          Date         Procedure    Procedure Detail Performing   
Clinician  
   
                          Start: 2023 Assay of free thyroxine               
 Nura Moore MD  
Work Phone:   
[phone]  
   
                                        Start: 2022   Us soft tissue head   
&   
neck real time imge docm                            Nura Moore MD  
Work Phone:   
[phone]  
   
                                        Start: 2022   Basic metabolic pane  
l   
calcium total                                       Nura Moore MD  
Work Phone:   
[phone]  
  
  
  
Plan of Treatment  
  
  
                          Date         Care Activity Detail       Author  
   
                          Start: 2032 COVID-19 Vaccine (#1) COVID-19 Vacci  
ne (#1) [facility]  
  
  
  
                                        Comment on above:   Postponed from  (Patient Refused)                     
                                                                                
                                                                                
                                                                                
                                                                                
                                                                                
                                                                                
                                                                                
                                                                                
                                                                                
                                                                                
                                                                         
  
  
  
                                        Start: 2027   Varicella vaccine (1  
 of   
2 - 2-dose childhood   
series)                                 Varicella vaccine (1 of   
2 - 2-dose childhood   
series)                                 [facility]  
  
  
  
                                        Comment on above:   Postponed from  (Not Indicated)                       
                                                                                
                                                                                
                                                                                
                                                                                
                                                                                
                                                                                
                                                                                
                                                                                
                                                                                
                                                                                
                                                                       
  
  
  
                          Start: 2024 Depression Monitoring Depression Mon  
itoring [facility]  
   
                                        Start: 2023   DTaP/Tdap/Td vaccine  
 (1   
- Tdap)                                 DTaP/Tdap/Td vaccine (1   
- Tdap)                                 [facility]  
  
  
  
                                        Comment on above:   Postponed from  (Patient Refused)                     
                                                                                
                                                                                
                                                                                
                                                                                
                                                                                
                                                                                
                                                                                
                                                                                
                                                                                
                                                                                
                                                                         
  
  
  
                          Start: 2023 Influenza vaccination              B  
ON [employer]  
  
  
  
                                        Comment on above:   Postponed from  (Patient Refused)                     
                                                                                
                                                                                
                                                                                
                                                                                
                                                                                
                                                                                
                                                                                
                                                                                
                                                                                
                                                                                
                                                                         
   
                                                            Postponed from  (Patient Refused)                                      
                                                                                
                                                                                
                                                                                
                                                                                
                                                                                
                                                                                
                                                                                
                                                                                
                                                                                
                                                                                
                                                        
  
  
  
                                                    Start: 2023  
End: 2023                         Patient encounter   
procedure                               2023 Office Visit   
Primary Care   
Adilene Griffin,   
[place] [address]   
[phone] (Work)   
[phone] (Fax)                      [facility]  
   
                          Start: 2023 Depression Monitoring Depression Mon  
itoring [facility]  
   
                                        Start: 2023   Hemoglobin A1c   
measurement                             A1C test (Diabetic or   
Prediabetic)                            [facility]  
   
                                                    Start: 2023  
End: 2023                         Patient encounter   
procedure                               2023 Office Visit   
Endocrinology Nura Moore MD [address] [place] [address] [phone] (Work)   
[phone] (Fax)                      [facility]   
Endo  
   
                                        Start: 2023   Screening for   
malignant neoplasm of   
cervix                    Cervical cancer screen    [facility]  
  
  
  
                                        Comment on above:   Postponed from  (Patient Refused)                     
                                                                                
                                                                                
                                                                                
                                                                                
                                                                                
                                                                                
                                                                                
                                                                                
                                                                                
                                                                                
                                                                         
  
  
  
                                                    Start: 2023  
End: 2023                         Patient encounter   
procedure                               2023 Office Visit   
Endocrinology Nura Moore MD [address]   
[address] [place]   
[address] [phone]   
(Work) [phone]   
(Fax)                                   [facility]   
Endo  
   
                                                    Start: 2023  
End: 2023                         Patient encounter   
procedure                               2023 Office Visit   
Primary Care   
Adilene Griffin[place] [address]   
[phone] (Work)   
[phone] (Fax)                      [facility]  
   
                                                    Start: 2022  
End: 2022                         Patient encounter   
procedure                               2022 Office Visit   
Primary Care   
Adilene Griffin[place] [address]   
[phone] (Work)   
[phone] (Fax)                      [facility]  
   
                          Start: 2022 Influenza vaccination Flu vaccine (#  
1) [facility]  
   
                                        Start: 2014   Screening for   
malignant neoplasm of   
cervix                                              [facility]  
   
                                        Start: 2005   Screening for   
malignant neoplasm of   
cervix                    Pap smear                 [facility]  
   
                                        Start: 2003   DTaP/Tdap/Td vaccine  
   
(1 - Tdap)                              DTaP/Tdap/Td vaccine (1   
- Tdap)                                 [facility]  
  
  
  
Immunizations  
  
  
                      Immunization Date Immunization Notes      Care Provider Fa  
cili  
   
                                        2019          tetanus toxoid,   
unspecified formulation                             Adilene Griffin   
APRN - CNP  
Work Phone:   
[phone]                          [facility]  
   
                                        10-          influenza virus   
vaccine, unspecified   
formulation                                         Adilene Griffin   
APRN - CNP  
Work Phone:   
[phone]                          [facility]  
Work Phone:   
[phone]  
  
  
  
Payers  
  
  
                          Date         Payer Category Payer        Policy ID  
   
                          2022   Unknown                   [id number] 1.2.84  
[id number]  
   
                          1984   Unknown                   [id number] 2.16.84  
[id number]  
   
                          1984   Unknown                   [id number] 2.16.84  
[id number]  
   
                          1984   Unknown                   [id number] 2.16.8  
[id number]  
   
                          1984   Unknown                   [id number] 2.16.  
[id number]  
   
                          1984   Unknown                   [id number] 2.16.  
[id number]  
   
                          1984   Unknown                   [id number] 2.16.  
[id number]  
   
                          1984   Unknown                   [id number] 2.16.  
[id number]  
   
                          1984   Unknown                   [id number] 2.16.840  
[id number]  
   
                          1984   Unknown                   [id number] 2.16.840  
[id number]  
   
                          1984   Unknown                   [id number] 2.16.840  
[id number]  
   
                          1984   Unknown                   [id number] 2.16.840.  
[id number]  
   
                          1984   Unknown                   [id number] 2.16.840.  
[id number]  
   
                          1984   Unknown                   [id number]  
[id number]  
   
                          1960   Unknown                   [id number]  
  
  
  
Social History  
  
  
                          Date         Type         Detail       Facility  
   
                                        Start: 2022   Tobacco smoking stat  
[facility]                      Never smoked tobacco      [facility]   
[email or web address]  
Work Phone:   
[phone]  
   
                                        Start: 2022   Tobacco use and   
exposure                                Smokeless tobacco   
non-user                                [facility] Phone:   
[phone]  
   
                                                    Start: 2022  
End: 2023           Alcohol intake            Lifetime non-drinker   
(finding)                               [facility] Phone:   
[phone]  
   
                                                    Start: 2021  
End: 2023                         History SDOH Alcohol   
Frequency                 1                         [facility] Phone:   
[phone]  
   
                                                    Start: 2021  
End: 2023     History SDOH Financial 5                   [facility] Phone:   
[phone]  
   
                          Start: 1985 Sex Assigned At Birth Female       B  
[facility] [employer]  
   
                                                    Start: 2022  
End: 2022                         Exposure to SARS-CoV-2   
(event)                   Not sure                  [facility]  
   
                                        Start: 2023   History SDOH Transpo  
rt   
Non-Med                   2                         [facility] Phone:   
[phone]  
   
                          Start: 2023              Pregnant     [facility] assistant Phone:   
[phone]  
  
  
  
Evaluation note  
  
  
                                Note Date & Type Note            Facility  
  
  
  
                                                    Evaluation note   
  
  
  
                                                    Diagnosis  
   
                                                      
  
  
PCOS (polycystic ovarian syndrome)  
  
  
Polycystic ovaries  
  
documented in this encounter  
[facility] Phone: [phone]  
  
Evaluation note  
  
  
                                Note Date & Type Note            Facility  
  
  
  
                                                    Evaluation note   
  
  
  
                                                    Diagnosis  
   
                                                      
  
  
Goiter  
  
  
Goiter, unspecified  
  
documented in this encounter  
[facility] Phone: [phone]  
  
Evaluation note  
  
  
                                Note Date & Type Note            Facility  
  
  
  
                                                    Evaluation note   
  
  
  
                                                    Diagnosis  
   
                                                      
  
  
Goiter  
  
  
Goiter, unspecified  
  
documented in this encounter  
[facility] Phone: [phone]  
  
Summary Purpose  
  
  
                                                      
  
  
  
Family History  
No Family History Records FoundNo Family History Records FoundNo Family History 
Records FoundNo Family History Records FoundNo Family History Records Found  
  
Advance Directives  
No Advanced Directives Records FoundNo Advanced Directives Records FoundNo 
Advanced Directives Records FoundNo Advanced Directives Records FoundNo Advanced
 Directives Records Found  
  
Reason for Referral  
  
  
                          Specialty    Diagnoses / Procedures Referred By Contac  
t Referred To Contact  
   
                                        Radiology             
  
  
Diagnoses  
  
  
Goiter  
  
  
  
Procedures  
  
  
US HEAD NECK SOFT TISSUE   
THYROID                                   
  
  
Nura Moore MD  
  
  
[address]  
  
  
[address]  
  
  
[place] [address]  
  
  
Phone: [phone]  
  
  
Fax: [phone]                         
  
  
  
  
  
                Referral ID Status  Reason  Start Date Expiration Date Visits Re  
quested Visits   
Authorized  
   
                [id number] Closed          2022 1       1  
  
  
  
Additional Source Comments  
  
  
  
                                                    INFORMATION SOURCE (unrecogn  
ized section and content)  
   
                                          
  
  
  
                                        DATE CREATED        AUTHOR  
   
                                2021                      [facility]  
  
  
  
                                DATE CREATED    AUTHOR          AUTHOR'S ORGANIZ  
ATION  
   
                                10/30/2021                      [facility]  
  
  
  
                                DATE CREATED    AUTHOR          AUTHOR'S ORGANIZ  
ATION  
   
                                2021                      [facility]  
  
  
  
                                DATE CREATED    AUTHOR          AUTHOR'S ORGANIZ  
ATION  
   
                                2023                      [facility]  
  
  
  
                                DATE CREATED    AUTHOR          AUTHOR'S ORGANIZ  
ATION  
   
                                12/10/2023                      [facility]  
[facility]  
  
  
  
  
  
                                                    Care Teams (unrecognized sec  
tion and content)  
   
                                          
  
  
  
                      Team Member Relationship Specialty  Start Date End Date  
   
                                                      
  
  
Adilene Griffin, APRN -   
CNP  
  
  
[address] [place] [address]  
  
  
[phone] (Work)  
  
  
[phone] (Fax) PCP - General   Family [facility] 21          
  
  
  
                      Team Member Relationship Specialty  Start Date End Date  
   
                                                      
  
  
Adilene Griffin, APRN -   
CNP  
  
  
[address] [place] [address]  
  
  
[phone] (Work)  
  
  
[phone] (Fax) PCP - General   Family Medicine 21          
  
  
  
                      Team Member Relationship Specialty  Start Date End Date  
   
                                                      
  
  
Adilene Griffin, APRN -   
CNP  
  
  
[address] [place] [address]  
  
  
[phone] (Work)  
  
  
[phone] (Fax) PCP - General   Family Medicine 21          
  
  
  
                      Team Member Relationship Specialty  Start Date End Date  
   
                                                      
  
  
Adilene Griffin, APRN -   
CNP  
  
  
[address] [place] [address]  
  
  
[phone] (Work)  
  
  
[phone] (Fax) PCP - General   Family Medicine 21          
  
  
  
  
  
                                                    Reason for Visit (unrecogniz  
ed section and content)  
   
                                          
  
  
  
                          Specialty    Diagnoses / Procedures Referred By Contac  
t Referred To Contact  
   
                                        Radiology             
  
  
Diagnoses  
  
  
Goiter  
  
  
  
Procedures  
  
  
US HEAD NECK SOFT TISSUE   
THYROID                                   
  
  
Nura Moore MD  
  
  
[address]  
  
  
[address]  
  
  
[place] [address]  
  
  
Phone: [phone]  
  
  
Fax: [phone]                         
  
  
  
  
  
                Referral ID Status  Reason  Start Date Expiration Date Visits Re  
quested Visits   
Authorized  
   
                [id number] Closed          2022 1       1  
  
  
FOR RECORDS PERTAINING TO PATIENTS WHO ARE OR HAVE BEEN ENROLLED IN A CHEMICAL 
DEPENDENCY/SUBSTANCEABUSE PROGRAM, SOME INFORMATION MAY BE OMITTED. This 
clinical summary was aggregated from multiple sources. Caution should be 
exercised in using it in the provision of clinical care. This summary normalizes
 information from multiple sources, and as a consequence, information in this 
document may materially change the coding, format and clinical context of 
patient data. In addition, data may be omitted in some cases. CLINICAL DECISIONS
 SHOULD BE BASED ON THE PRIMARY CLINICAL RECORDS. [employer] provides
 no warranty or guarantee of the accuracy or completeness of information in this
 document.

## 2024-02-06 ENCOUNTER — OFFICE VISIT (OUTPATIENT)
Dept: ENDOCRINOLOGY | Age: 39
End: 2024-02-06
Payer: COMMERCIAL

## 2024-02-06 VITALS
HEART RATE: 73 BPM | HEIGHT: 61 IN | OXYGEN SATURATION: 97 % | DIASTOLIC BLOOD PRESSURE: 72 MMHG | WEIGHT: 177 LBS | BODY MASS INDEX: 33.42 KG/M2 | SYSTOLIC BLOOD PRESSURE: 111 MMHG

## 2024-02-06 DIAGNOSIS — E06.3 HYPOTHYROIDISM DUE TO HASHIMOTO'S THYROIDITIS: Primary | ICD-10-CM

## 2024-02-06 DIAGNOSIS — E03.8 HYPOTHYROIDISM DUE TO HASHIMOTO'S THYROIDITIS: Primary | ICD-10-CM

## 2024-02-06 DIAGNOSIS — E03.8 HYPOTHYROIDISM DUE TO HASHIMOTO'S THYROIDITIS: ICD-10-CM

## 2024-02-06 DIAGNOSIS — E06.3 HYPOTHYROIDISM DUE TO HASHIMOTO'S THYROIDITIS: ICD-10-CM

## 2024-02-06 LAB
T4 FREE SERPL-MCNC: 1.26 NG/DL (ref 0.84–1.68)
TSH SERPL-MCNC: 1.04 UIU/ML (ref 0.44–3.86)

## 2024-02-06 PROCEDURE — 99213 OFFICE O/P EST LOW 20 MIN: CPT | Performed by: INTERNAL MEDICINE

## 2024-02-06 RX ORDER — LEVOTHYROXINE SODIUM 0.03 MG/1
TABLET ORAL
Qty: 45 TABLET | Refills: 0
Start: 2024-02-06

## 2024-02-06 NOTE — PROGRESS NOTES
2/6/2024    Assessment:       Diagnosis Orders   1. Hypothyroidism due to Hashimoto's thyroiditis              PLAN:     Continue current dose of levothyroxine 12.5 mcg daily patient to follow-up in 6 months  Orders Placed This Encounter   Procedures    TSH     Standing Status:   Future     Standing Expiration Date:   2/6/2025    T4, Free     Standing Status:   Future     Standing Expiration Date:   2/6/2025           No orders of the defined types were placed in this encounter.    No orders of the defined types were placed in this encounter.    No follow-ups on file.  Subjective:     Chief Complaint   Patient presents with    Hypothyroidism    Hashimoto's Thyroiditis     Vitals:    02/06/24 1134   BP: 111/72   Pulse: 73   SpO2: 97%   Weight: 80.3 kg (177 lb)   Height: 1.549 m (5' 0.98\")     Wt Readings from Last 3 Encounters:   02/06/24 80.3 kg (177 lb)   11/29/23 85.3 kg (188 lb)   08/23/23 90.3 kg (199 lb)     BP Readings from Last 3 Encounters:   02/06/24 111/72   11/29/23 (!) 142/96   08/23/23 122/78     Follow-up on hypothyroidism Hashimoto thyroiditis status post delivery 2 months ago currently on 12.5 mcg daily TSH was 0.64 in November 2 months ago labs were also done today and not available for review    Thyroid Problem  Presents for follow-up visit. Patient reports no cold intolerance, heat intolerance, palpitations or tremors. The symptoms have been stable.         Units 2 mo ago Comments   TSH W/REFLEX TO FT4  mIU/L 0.64      Past Medical History:   Diagnosis Date    Hyperthyroidism     PCOS (polycystic ovarian syndrome)     Prediabetes      No past surgical history on file.  Social History     Socioeconomic History    Marital status:      Spouse name: Not on file    Number of children: Not on file    Years of education: Not on file    Highest education level: Not on file   Occupational History    Not on file   Tobacco Use    Smoking status: Never    Smokeless tobacco: Never   Vaping Use    Vaping

## 2024-02-17 DIAGNOSIS — E03.8 HYPOTHYROIDISM DUE TO HASHIMOTO'S THYROIDITIS: ICD-10-CM

## 2024-02-17 DIAGNOSIS — E06.3 HYPOTHYROIDISM DUE TO HASHIMOTO'S THYROIDITIS: ICD-10-CM

## 2024-02-19 RX ORDER — LEVOTHYROXINE SODIUM 0.03 MG/1
TABLET ORAL
Qty: 90 TABLET | Refills: 1 | OUTPATIENT
Start: 2024-02-19

## 2024-03-03 PROBLEM — Z12.4 SCREENING FOR CERVICAL CANCER: Status: ACTIVE | Noted: 2024-03-03

## 2024-03-08 ENCOUNTER — OFFICE VISIT (OUTPATIENT)
Facility: CLINIC | Age: 39
End: 2024-03-08
Payer: COMMERCIAL

## 2024-03-08 VITALS
SYSTOLIC BLOOD PRESSURE: 142 MMHG | TEMPERATURE: 98.3 F | WEIGHT: 211.4 LBS | BODY MASS INDEX: 35.22 KG/M2 | OXYGEN SATURATION: 97 % | DIASTOLIC BLOOD PRESSURE: 94 MMHG | HEIGHT: 65 IN | HEART RATE: 80 BPM

## 2024-03-08 DIAGNOSIS — K21.9 GASTROESOPHAGEAL REFLUX DISEASE WITHOUT ESOPHAGITIS: ICD-10-CM

## 2024-03-08 DIAGNOSIS — E55.9 VITAMIN D DEFICIENCY: ICD-10-CM

## 2024-03-08 DIAGNOSIS — J30.1 SEASONAL ALLERGIC RHINITIS DUE TO POLLEN: ICD-10-CM

## 2024-03-08 DIAGNOSIS — D57.3 SICKLE CELL TRAIT (HCC): ICD-10-CM

## 2024-03-08 DIAGNOSIS — I10 PRIMARY HYPERTENSION: ICD-10-CM

## 2024-03-08 DIAGNOSIS — Z12.4 SCREENING FOR CERVICAL CANCER: ICD-10-CM

## 2024-03-08 DIAGNOSIS — Z11.59 NEED FOR HEPATITIS C SCREENING TEST: ICD-10-CM

## 2024-03-08 PROCEDURE — 99214 OFFICE O/P EST MOD 30 MIN: CPT | Performed by: INTERNAL MEDICINE

## 2024-03-08 PROCEDURE — 3080F DIAST BP >= 90 MM HG: CPT | Performed by: INTERNAL MEDICINE

## 2024-03-08 PROCEDURE — 3075F SYST BP GE 130 - 139MM HG: CPT | Performed by: INTERNAL MEDICINE

## 2024-03-08 RX ORDER — AMLODIPINE BESYLATE 2.5 MG/1
2.5 TABLET ORAL DAILY
Qty: 30 TABLET | Refills: 2 | Status: SHIPPED | OUTPATIENT
Start: 2024-03-08

## 2024-03-08 RX ORDER — BLOOD PRESSURE TEST KIT
KIT MISCELLANEOUS
Qty: 1 KIT | Refills: 1 | Status: SHIPPED | OUTPATIENT
Start: 2024-03-08

## 2024-03-08 RX ORDER — FLUTICASONE PROPIONATE 50 MCG
2 SPRAY, SUSPENSION (ML) NASAL DAILY
Qty: 16 G | Refills: 3 | Status: SHIPPED | OUTPATIENT
Start: 2024-03-08

## 2024-03-08 RX ORDER — PANTOPRAZOLE SODIUM 40 MG/1
40 TABLET, DELAYED RELEASE ORAL
Qty: 30 TABLET | Refills: 3 | Status: SHIPPED | OUTPATIENT
Start: 2024-03-08

## 2024-03-08 RX ORDER — CANDESARTAN 4 MG/1
4 TABLET ORAL DAILY
Qty: 30 TABLET | Refills: 2 | Status: SHIPPED | OUTPATIENT
Start: 2024-03-08 | End: 2024-03-08 | Stop reason: CLARIF

## 2024-03-08 SDOH — ECONOMIC STABILITY: FOOD INSECURITY: WITHIN THE PAST 12 MONTHS, THE FOOD YOU BOUGHT JUST DIDN'T LAST AND YOU DIDN'T HAVE MONEY TO GET MORE.: NEVER TRUE

## 2024-03-08 SDOH — ECONOMIC STABILITY: INCOME INSECURITY: HOW HARD IS IT FOR YOU TO PAY FOR THE VERY BASICS LIKE FOOD, HOUSING, MEDICAL CARE, AND HEATING?: NOT HARD AT ALL

## 2024-03-08 SDOH — ECONOMIC STABILITY: FOOD INSECURITY: WITHIN THE PAST 12 MONTHS, YOU WORRIED THAT YOUR FOOD WOULD RUN OUT BEFORE YOU GOT MONEY TO BUY MORE.: NEVER TRUE

## 2024-03-08 SDOH — ECONOMIC STABILITY: HOUSING INSECURITY
IN THE LAST 12 MONTHS, WAS THERE A TIME WHEN YOU DID NOT HAVE A STEADY PLACE TO SLEEP OR SLEPT IN A SHELTER (INCLUDING NOW)?: NO

## 2024-03-08 ASSESSMENT — PATIENT HEALTH QUESTIONNAIRE - PHQ9
10. IF YOU CHECKED OFF ANY PROBLEMS, HOW DIFFICULT HAVE THESE PROBLEMS MADE IT FOR YOU TO DO YOUR WORK, TAKE CARE OF THINGS AT HOME, OR GET ALONG WITH OTHER PEOPLE: 0
SUM OF ALL RESPONSES TO PHQ QUESTIONS 1-9: 0
7. TROUBLE CONCENTRATING ON THINGS, SUCH AS READING THE NEWSPAPER OR WATCHING TELEVISION: 0
9. THOUGHTS THAT YOU WOULD BE BETTER OFF DEAD, OR OF HURTING YOURSELF: 0
5. POOR APPETITE OR OVEREATING: 0
SUM OF ALL RESPONSES TO PHQ QUESTIONS 1-9: 0
SUM OF ALL RESPONSES TO PHQ9 QUESTIONS 1 & 2: 0
1. LITTLE INTEREST OR PLEASURE IN DOING THINGS: 0
3. TROUBLE FALLING OR STAYING ASLEEP: 0
SUM OF ALL RESPONSES TO PHQ QUESTIONS 1-9: 0
8. MOVING OR SPEAKING SO SLOWLY THAT OTHER PEOPLE COULD HAVE NOTICED. OR THE OPPOSITE, BEING SO FIGETY OR RESTLESS THAT YOU HAVE BEEN MOVING AROUND A LOT MORE THAN USUAL: 0
6. FEELING BAD ABOUT YOURSELF - OR THAT YOU ARE A FAILURE OR HAVE LET YOURSELF OR YOUR FAMILY DOWN: 0
2. FEELING DOWN, DEPRESSED OR HOPELESS: 0
SUM OF ALL RESPONSES TO PHQ QUESTIONS 1-9: 0
4. FEELING TIRED OR HAVING LITTLE ENERGY: 0

## 2024-03-08 ASSESSMENT — ENCOUNTER SYMPTOMS
EYES NEGATIVE: 1
GASTROINTESTINAL NEGATIVE: 1
ALLERGIC/IMMUNOLOGIC NEGATIVE: 1
RESPIRATORY NEGATIVE: 1

## 2024-03-08 NOTE — PROGRESS NOTES
.  Chief Complaint   Patient presents with    Follow-up Chronic Condition          Gastroesophageal Reflux     Taking Omeprazole 20 mg daily with no relief.       .  \"Have you been to the ER, urgent care clinic since your last visit?  Hospitalized since your last visit?\"    YES - When: approximately 1 months ago.  Where and Why: [facility] for flu. Tested Positive 02/14/24    “Have you seen or consulted any other health care providers outside of [facility] since your last visit?”    NO        “Have you had a pap smear?”    YES - Where: 3/2023 has appointment scheduled 3/11/24 Nurse/CMA to request most recent records if not in the chart      .BP (!) 133/91 (Site: Left Upper Arm, Position: Sitting, Cuff Size: Medium Adult)   Pulse 80   Temp 98.3 °F (36.8 °C) (Oral)   Ht 1.651 m (5' 5\")   Wt 95.9 kg (211 lb 6.4 oz)   SpO2 97%   BMI 35.18 kg/m²        
        SUBJECTIVE/OBJECTIVE:  hospitals    Shana Meier with a pleasant personality came for regular follow-up.  She has stopped taking anxiety medicines.  She says that she had some headache.  Today her blood pressure is elevated after relaxing in both upper arms.  Started on lowest dose of amlodipine and she wants to do blood work.    She has sickle cell trait.  She says that omeprazole is not helping so started on pantoprazole and she agreed.  In previous visit I had ordered labs that she forgot to do.  She has no anxiety or depression.  She has allergies taking loratadine and fluticasone nasal sprays.  She follows gynecologist    Allergies   Allergen Reactions    Dye [Iodides] Anaphylaxis    Cephalexin Hives     Current Outpatient Medications   Medication Sig Dispense Refill    pantoprazole (PROTONIX) 40 MG tablet Take 1 tablet by mouth daily (before dinner) 30 tablet 3    fluticasone (FLONASE) 50 MCG/ACT nasal spray 2 sprays by Nasal route daily 16 g 3    amLODIPine (NORVASC) 2.5 MG tablet Take 1 tablet by mouth daily 30 tablet 2    Blood Pressure KIT Has hypertension.I10,check bp twice a day in relaxed position. 1 kit 1    gabapentin (NEURONTIN) 300 MG capsule 2 po qhs      ketoconazole (NIZORAL) 2 % cream APPLY CREAM TOPICALLY TO AFFECTED FACE RASH ONCE DAILY      ketoconazole (NIZORAL) 2 % shampoo LATHER ON DRY OR DAMP SCALP AND HAIRLINE, LET SIT FOR 10 MINUTES, THEN ADD WATER/LATHER AND RINSE. USE ONCE A WEEK.      loratadine (CLARITIN) 10 MG tablet Take 1 tablet by mouth daily 30 tablet 3     No current facility-administered medications for this visit.      Past Medical History:   Diagnosis Date    Anxiety     Asthma     as a child    COVID-19 01/2022    Obesity     Psychiatric disorder     Sickle-cell disease, unspecified     trait     Past Surgical History:   Procedure Laterality Date    CARPAL TUNNEL RELEASE Left     CYST INCISION AND DRAINAGE      LABIA     History reviewed. No pertinent family

## 2024-03-09 LAB
25(OH)D3+25(OH)D2 SERPL-MCNC: 30.6 NG/ML (ref 30–100)
ALBUMIN SERPL-MCNC: 4 G/DL (ref 3.9–4.9)
ALBUMIN/GLOB SERPL: 1.5 {RATIO} (ref 1.2–2.2)
ALP SERPL-CCNC: 39 IU/L (ref 44–121)
ALT SERPL-CCNC: 12 IU/L (ref 0–32)
AST SERPL-CCNC: 16 IU/L (ref 0–40)
BASOPHILS # BLD AUTO: 0 X10E3/UL (ref 0–0.2)
BASOPHILS NFR BLD AUTO: 0 %
BILIRUB SERPL-MCNC: 0.2 MG/DL (ref 0–1.2)
BUN SERPL-MCNC: 9 MG/DL (ref 6–20)
BUN/CREAT SERPL: 12 (ref 9–23)
CALCIUM SERPL-MCNC: 9.2 MG/DL (ref 8.7–10.2)
CHLORIDE SERPL-SCNC: 102 MMOL/L (ref 96–106)
CO2 SERPL-SCNC: 24 MMOL/L (ref 20–29)
CREAT SERPL-MCNC: 0.75 MG/DL (ref 0.57–1)
EGFRCR SERPLBLD CKD-EPI 2021: 104 ML/MIN/1.73
EOSINOPHIL # BLD AUTO: 0.2 X10E3/UL (ref 0–0.4)
EOSINOPHIL NFR BLD AUTO: 2 %
ERYTHROCYTE [DISTWIDTH] IN BLOOD BY AUTOMATED COUNT: 15.9 % (ref 11.7–15.4)
GLOBULIN SER CALC-MCNC: 2.7 G/DL (ref 1.5–4.5)
GLUCOSE SERPL-MCNC: 76 MG/DL (ref 70–99)
HBA1C MFR BLD: 5.8 % (ref 4.8–5.6)
HCT VFR BLD AUTO: 35.7 % (ref 34–46.6)
HCV IGG SERPL QL IA: NON REACTIVE
HGB BLD-MCNC: 11.2 G/DL (ref 11.1–15.9)
IMM GRANULOCYTES # BLD AUTO: 0 X10E3/UL (ref 0–0.1)
IMM GRANULOCYTES NFR BLD AUTO: 0 %
LYMPHOCYTES # BLD AUTO: 3.5 X10E3/UL (ref 0.7–3.1)
LYMPHOCYTES NFR BLD AUTO: 36 %
MCH RBC QN AUTO: 23.5 PG (ref 26.6–33)
MCHC RBC AUTO-ENTMCNC: 31.4 G/DL (ref 31.5–35.7)
MCV RBC AUTO: 75 FL (ref 79–97)
MONOCYTES # BLD AUTO: 0.7 X10E3/UL (ref 0.1–0.9)
MONOCYTES NFR BLD AUTO: 8 %
NEUTROPHILS # BLD AUTO: 5.3 X10E3/UL (ref 1.4–7)
NEUTROPHILS NFR BLD AUTO: 54 %
PLATELET # BLD AUTO: 357 X10E3/UL (ref 150–450)
POTASSIUM SERPL-SCNC: 4.1 MMOL/L (ref 3.5–5.2)
PROT SERPL-MCNC: 6.7 G/DL (ref 6–8.5)
RBC # BLD AUTO: 4.77 X10E6/UL (ref 3.77–5.28)
SODIUM SERPL-SCNC: 140 MMOL/L (ref 134–144)
TSH SERPL DL<=0.005 MIU/L-ACNC: 1.35 UIU/ML (ref 0.45–4.5)
WBC # BLD AUTO: 9.8 X10E3/UL (ref 3.4–10.8)

## 2024-04-02 PROBLEM — Z12.4 SCREENING FOR CERVICAL CANCER: Status: RESOLVED | Noted: 2024-03-03 | Resolved: 2024-04-02

## 2024-04-07 PROBLEM — Z11.59 NEED FOR HEPATITIS C SCREENING TEST: Status: RESOLVED | Noted: 2024-03-08 | Resolved: 2024-04-07

## 2024-04-23 ENCOUNTER — TELEPHONE (OUTPATIENT)
Facility: CLINIC | Age: 39
End: 2024-04-23

## 2024-05-09 PROBLEM — E55.9 VITAMIN D DEFICIENCY: Status: RESOLVED | Noted: 2023-06-29 | Resolved: 2024-05-09

## 2024-05-17 ENCOUNTER — OFFICE VISIT (OUTPATIENT)
Facility: CLINIC | Age: 39
End: 2024-05-17
Payer: COMMERCIAL

## 2024-05-17 VITALS
RESPIRATION RATE: 16 BRPM | DIASTOLIC BLOOD PRESSURE: 80 MMHG | HEART RATE: 72 BPM | SYSTOLIC BLOOD PRESSURE: 120 MMHG | OXYGEN SATURATION: 98 % | HEIGHT: 65 IN | WEIGHT: 214 LBS | BODY MASS INDEX: 35.65 KG/M2

## 2024-05-17 DIAGNOSIS — J30.1 SEASONAL ALLERGIC RHINITIS DUE TO POLLEN: ICD-10-CM

## 2024-05-17 DIAGNOSIS — K21.9 GASTROESOPHAGEAL REFLUX DISEASE WITHOUT ESOPHAGITIS: ICD-10-CM

## 2024-05-17 DIAGNOSIS — Z86.79 HISTORY OF HYPERTENSION: ICD-10-CM

## 2024-05-17 DIAGNOSIS — D57.3 SICKLE CELL TRAIT (HCC): ICD-10-CM

## 2024-05-17 PROCEDURE — 99213 OFFICE O/P EST LOW 20 MIN: CPT | Performed by: INTERNAL MEDICINE

## 2024-05-17 ASSESSMENT — ENCOUNTER SYMPTOMS
ALLERGIC/IMMUNOLOGIC NEGATIVE: 1
EYES NEGATIVE: 1
GASTROINTESTINAL NEGATIVE: 1
RESPIRATORY NEGATIVE: 1

## 2024-05-17 NOTE — PROGRESS NOTES
Shana Meier (: 1985) is a 38 y.o. female, Established patient patient, here for evaluation of the following chief complaint(s):  Other (burning sensation in throat and needs referral for gastroenterologist.)         ASSESSMENT/PLAN:  Below is the assessment and plan developed based on review of pertinent history, physical exam, labs, studies, and medications.      1. Gastroesophageal reflux disease without esophagitis  -     External Referral To Gastroenterology  2. BMI 35.0-35.9,adult  3. Sickle cell trait (HCC)  4. Seasonal allergic rhinitis due to pollen  5. History of hypertension    History of hypertension.  Today blood pressure is absolutely well-controlled and she has stopped taking amlodipine 2.5 mg/day and she says that now she has no anxiety or depression.  Her mother used to see me in the past who is disease now.  She says that her son was not guilty and in between she was under terrible stress because her son was falsely accused.    GERD she is taking PPI.  She never smokes.  Still she is having same symptoms.  She wants to see gastroenterologist.  I referred her    Sickle cell trait continue regular supplements.    BMI 35.61 healthy eating habits.    Her throat exam is normal.  Initially she felt better after being on pantoprazole but still having some sensation of burning in the throat.    Allergies takes loratadine and fluticasone nasal spray.    No depression.    Return in about 6 months (around 2024) for physical follow up.         SUBJECTIVE/OBJECTIVE:  HPI    Shana Meier with a pleasant personality came for regular follow-up.  Her blood pressure is well-controlled without being on medicines.  She does take pantoprazole for heartburn.  She does not feel improvement now.  She wants to be evaluated by gastroenterologist.  She has history of sickle cell trait and allergies.  She is not taking amlodipine.  She says now she has no anxiety depression.  Because her son was not guilty.

## 2024-05-17 NOTE — PROGRESS NOTES
Chief Complaint   Patient presents with    Other     burning sensation in throat and ears           /80 (Site: Left Upper Arm, Position: Sitting)   Pulse 74   Resp 16   Ht 1.651 m (5' 5\")   Wt 97.1 kg (214 lb)   SpO2 98%   BMI 35.61 kg/m²       \"Have you been to the ER, urgent care clinic since your last visit?  Hospitalized since your last visit?\"    NO    “Have you seen or consulted any other health care providers outside of [facility] since your last visit?”    NO     “Have you had a pap smear?”    YES - Where: 03/24 [facility] Nurse/CMA to request most recent records if not in the chart    No cervical cancer screening on file             Click Here for Release of Records Request

## 2024-05-24 ENCOUNTER — HOSPITAL ENCOUNTER (OUTPATIENT)
Facility: HOSPITAL | Age: 39
Discharge: HOME | End: 2024-05-24
Payer: COMMERCIAL

## 2024-05-24 VITALS
DIASTOLIC BLOOD PRESSURE: 80 MMHG | BODY MASS INDEX: 36.15 KG/M2 | RESPIRATION RATE: 18 BRPM | HEIGHT: 65 IN | SYSTOLIC BLOOD PRESSURE: 125 MMHG | HEART RATE: 68 BPM | WEIGHT: 217 LBS | TEMPERATURE: 97.3 F | OXYGEN SATURATION: 100 %

## 2024-05-24 LAB — HCG UR QL: NEGATIVE

## 2024-05-24 PROCEDURE — 81025 URINE PREGNANCY TEST: CPT

## 2024-05-24 RX ORDER — UREA 10 %
500 LOTION (ML) TOPICAL DAILY
COMMUNITY

## 2024-05-24 RX ORDER — CETIRIZINE HYDROCHLORIDE 10 MG/1
10 TABLET ORAL DAILY PRN
COMMUNITY

## 2024-05-24 RX ORDER — M-VIT,TX,IRON,MINS/CALC/FOLIC 27MG-0.4MG
2 TABLET ORAL DAILY
COMMUNITY

## 2024-05-24 NOTE — PERIOP NOTE
[facility]                   [address] [place] [address]   MAIN OR                                  [phone]   MAIN PRE OP           [phone]                                                                                AMBULATORY PRE OP          [phone]  PRE-ADMISSION TESTING    [phone]   Surgery Date:  Monday, Raquel 3       Is surgery arrival time given by surgeon?   NO  If “NO”, [facility] staff will call you between 3 and 7pm the day before your surgery with your arrival time. (If your surgery is on a Monday, we will call you the Friday before.)    Call [phone] after 7pm Monday-Friday if you did not receive this call.    INSTRUCTIONS BEFORE YOUR SURGERY   When You  Arrive Arrive at the 2nd Floor Admitting Desk on the day of your surgery  Have your insurance card, photo ID,living will/advanced directive/POA (if applicable),  and any copayment (if needed)   Food   and   Drink NO food or drink after midnight the night before surgery    This means NO water, gum, mints, coffee, juice, etc.  No alcohol (beer, wine, liquor) or marijuana 24 hours before and after surgery   Medications to   TAKE   Morning of Surgery MEDICATIONS TO TAKE THE MORNING OF SURGERY WITH A SIP OF WATER:   Cetirizine/Zyrtec if needed  Flonase  Use Albuterol inhaler if needed and bring it with you   Medications  To  STOP      7 days before surgery Non-Steroidal anti-inflammatory Drugs (NSAID's): for example, Ibuprofen (Advil, Motrin), Naproxen (Aleve)  Aspirin, if taking for pain   Herbal supplements, vitamins, and fish oil  Other:  (Pain medications not listed above, including Tylenol may be taken)       Bathing Clothing  Jewelry  Valuables     If you shower the morning of surgery, please do not apply anything to your skin (lotions, powders, deodorant, or makeup, especially mascara)  Follow Chlorhexidine Care Fusion body wash instructions provided to you during PAT appointment.  Begin 3 days prior to surgery.  Do not shave or trim anywhere 24 hours before surgery  Wear your hair loose or down; no pony-tails, buns, or metal hair clips  Wear loose, comfortable, clean clothes  Wear glasses instead of contacts. Bring a case to keep your glasses safe.  Leave money, valuables, and jewelry, including body piercings, at home  If you were given an Incentive Spirometer, bring it on day of surgery.  If you use inhalers or CPAP machine, bring it with you the day of surgery.     Going Home - or Spending the Night SAME-DAY SURGERY: You must have a responsible adult drive you home and stay with you 24 hours after surgery. You may not drive for 24 hours after surgery.  ADMITS: If your doctor is keeping you in the hospital after surgery, leave personal belongings/luggage in your car until you have a hospital room number.    Hospital discharge time is 12 noon  Drivers must be here before 12 noon unless you are told differently   Special Instructions Free  parking available from 7 AM until 5 PM.         Follow all instructions so your surgery won’t be cancelled.  Please, be on time.                    If a situation occurs and you are delayed the day of surgery, call [phone].    If your physical condition changes (like a fever, cold, flu, etc.) call your surgeon.    Home medication(s) reviewed and verified via LIST and VERBAL   during PAT appointment.    The patient was contacted by  IN-PERSON.  The patient verbalizes understanding of all instructions and DOES NOT need reinforcement.

## 2024-05-31 NOTE — PERIOP NOTE
Hello,     You are scheduled to have surgery Monday at [facility].     We would like for you to arrive at  0530 am  We are located on the second floor, [address]. You will check-in at the registration desk located outside the elevators on the second floor prior to proceeding to [address].  Remember nothing to eat or drink after midnight on Sunday. If you need to take medications the morning of surgery, please take with a few sips of water.   Wear loose, comfortable clothing and leave all your jewelry at home.   You may bring your cell phone with you.  One family member will be allowed in the pre-op area once you are dressed and your IV has been started.   You will need someone to drive you home and be with you for 24 hours post-anesthesia.     We look forward to seeing you! Call [phone] for questions after hours and [phone] between 5:30AM and 6PM.     Thanks!    [facility] ASU PREOP TEAM

## 2024-06-03 ENCOUNTER — HOSPITAL ENCOUNTER (OUTPATIENT)
Facility: HOSPITAL | Age: 39
Setting detail: OUTPATIENT SURGERY
Discharge: HOME | End: 2024-06-03
Attending: OBSTETRICS & GYNECOLOGY | Admitting: OBSTETRICS & GYNECOLOGY
Payer: COMMERCIAL

## 2024-06-03 ENCOUNTER — ANESTHESIA EVENT (OUTPATIENT)
Facility: HOSPITAL | Age: 39
End: 2024-06-03
Payer: COMMERCIAL

## 2024-06-03 ENCOUNTER — ANESTHESIA (OUTPATIENT)
Facility: HOSPITAL | Age: 39
End: 2024-06-03
Payer: COMMERCIAL

## 2024-06-03 VITALS
HEIGHT: 65 IN | HEART RATE: 67 BPM | SYSTOLIC BLOOD PRESSURE: 118 MMHG | RESPIRATION RATE: 15 BRPM | DIASTOLIC BLOOD PRESSURE: 90 MMHG | OXYGEN SATURATION: 92 % | TEMPERATURE: 97.9 F | WEIGHT: 219.14 LBS | BODY MASS INDEX: 36.51 KG/M2

## 2024-06-03 DIAGNOSIS — Z98.890 STATUS POST SURGERY: Primary | ICD-10-CM

## 2024-06-03 LAB
HCG UR QL: NEGATIVE
HCG UR QL: NEGATIVE

## 2024-06-03 PROCEDURE — 2720000010 HC SURG SUPPLY STERILE: Performed by: OBSTETRICS & GYNECOLOGY

## 2024-06-03 PROCEDURE — 3700000001 HC ADD 15 MINUTES (ANESTHESIA): Performed by: OBSTETRICS & GYNECOLOGY

## 2024-06-03 PROCEDURE — 88307 TISSUE EXAM BY PATHOLOGIST: CPT

## 2024-06-03 PROCEDURE — 6370000000 HC RX 637 (ALT 250 FOR IP): Performed by: OBSTETRICS & GYNECOLOGY

## 2024-06-03 PROCEDURE — 2580000003 HC RX 258: Performed by: ANESTHESIOLOGY

## 2024-06-03 PROCEDURE — 81025 URINE PREGNANCY TEST: CPT

## 2024-06-03 PROCEDURE — 3600000014 HC SURGERY LEVEL 4 ADDTL 15MIN: Performed by: OBSTETRICS & GYNECOLOGY

## 2024-06-03 PROCEDURE — 7100000000 HC PACU RECOVERY - FIRST 15 MIN: Performed by: OBSTETRICS & GYNECOLOGY

## 2024-06-03 PROCEDURE — 6370000000 HC RX 637 (ALT 250 FOR IP): Performed by: ANESTHESIOLOGY

## 2024-06-03 PROCEDURE — 7100000001 HC PACU RECOVERY - ADDTL 15 MIN: Performed by: OBSTETRICS & GYNECOLOGY

## 2024-06-03 PROCEDURE — 88305 TISSUE EXAM BY PATHOLOGIST: CPT

## 2024-06-03 PROCEDURE — 6360000002 HC RX W HCPCS: Performed by: NURSE ANESTHETIST, CERTIFIED REGISTERED

## 2024-06-03 PROCEDURE — 2500000003 HC RX 250 WO HCPCS: Performed by: NURSE ANESTHETIST, CERTIFIED REGISTERED

## 2024-06-03 PROCEDURE — 2500000003 HC RX 250 WO HCPCS: Performed by: OBSTETRICS & GYNECOLOGY

## 2024-06-03 PROCEDURE — 7100000011 HC PHASE II RECOVERY - ADDTL 15 MIN: Performed by: OBSTETRICS & GYNECOLOGY

## 2024-06-03 PROCEDURE — 2709999900 HC NON-CHARGEABLE SUPPLY: Performed by: OBSTETRICS & GYNECOLOGY

## 2024-06-03 PROCEDURE — 6360000002 HC RX W HCPCS: Performed by: ANESTHESIOLOGY

## 2024-06-03 PROCEDURE — 7100000010 HC PHASE II RECOVERY - FIRST 15 MIN: Performed by: OBSTETRICS & GYNECOLOGY

## 2024-06-03 PROCEDURE — 3600000004 HC SURGERY LEVEL 4 BASE: Performed by: OBSTETRICS & GYNECOLOGY

## 2024-06-03 PROCEDURE — 88302 TISSUE EXAM BY PATHOLOGIST: CPT

## 2024-06-03 PROCEDURE — 3700000000 HC ANESTHESIA ATTENDED CARE: Performed by: OBSTETRICS & GYNECOLOGY

## 2024-06-03 RX ORDER — IODINE SOLUTION STRONG 5% (LUGOL'S) 5 %
SOLUTION ORAL PRN
Status: DISCONTINUED | OUTPATIENT
Start: 2024-06-03 | End: 2024-06-03 | Stop reason: HOSPADM

## 2024-06-03 RX ORDER — ACETAMINOPHEN 325 MG/1
650 TABLET ORAL ONCE
Status: COMPLETED | OUTPATIENT
Start: 2024-06-03 | End: 2024-06-03

## 2024-06-03 RX ORDER — DIPHENHYDRAMINE HYDROCHLORIDE 50 MG/ML
12.5 INJECTION INTRAMUSCULAR; INTRAVENOUS
Status: DISCONTINUED | OUTPATIENT
Start: 2024-06-03 | End: 2024-06-03 | Stop reason: HOSPADM

## 2024-06-03 RX ORDER — NALOXONE HYDROCHLORIDE 0.4 MG/ML
INJECTION, SOLUTION INTRAMUSCULAR; INTRAVENOUS; SUBCUTANEOUS PRN
Status: DISCONTINUED | OUTPATIENT
Start: 2024-06-03 | End: 2024-06-03 | Stop reason: HOSPADM

## 2024-06-03 RX ORDER — FERRIC SUBSULFATE 0.21 G/G
LIQUID TOPICAL PRN
Status: DISCONTINUED | OUTPATIENT
Start: 2024-06-03 | End: 2024-06-03 | Stop reason: HOSPADM

## 2024-06-03 RX ORDER — DROPERIDOL 2.5 MG/ML
0.62 INJECTION, SOLUTION INTRAMUSCULAR; INTRAVENOUS
Status: COMPLETED | OUTPATIENT
Start: 2024-06-03 | End: 2024-06-03

## 2024-06-03 RX ORDER — HYDROMORPHONE HYDROCHLORIDE 2 MG/ML
INJECTION, SOLUTION INTRAMUSCULAR; INTRAVENOUS; SUBCUTANEOUS PRN
Status: DISCONTINUED | OUTPATIENT
Start: 2024-06-03 | End: 2024-06-03 | Stop reason: SDUPTHER

## 2024-06-03 RX ORDER — HYDROMORPHONE HYDROCHLORIDE 1 MG/ML
1 INJECTION, SOLUTION INTRAMUSCULAR; INTRAVENOUS; SUBCUTANEOUS EVERY 5 MIN PRN
Status: DISCONTINUED | OUTPATIENT
Start: 2024-06-03 | End: 2024-06-03 | Stop reason: HOSPADM

## 2024-06-03 RX ORDER — SODIUM CHLORIDE, SODIUM LACTATE, POTASSIUM CHLORIDE, CALCIUM CHLORIDE 600; 310; 30; 20 MG/100ML; MG/100ML; MG/100ML; MG/100ML
INJECTION, SOLUTION INTRAVENOUS CONTINUOUS
Status: DISCONTINUED | OUTPATIENT
Start: 2024-06-03 | End: 2024-06-03 | Stop reason: HOSPADM

## 2024-06-03 RX ORDER — KETAMINE HCL IN NACL, ISO-OSM 100MG/10ML
SYRINGE (ML) INJECTION PRN
Status: DISCONTINUED | OUTPATIENT
Start: 2024-06-03 | End: 2024-06-03 | Stop reason: SDUPTHER

## 2024-06-03 RX ORDER — BUPIVACAINE HYDROCHLORIDE AND EPINEPHRINE 2.5; 5 MG/ML; UG/ML
INJECTION, SOLUTION EPIDURAL; INFILTRATION; INTRACAUDAL; PERINEURAL PRN
Status: DISCONTINUED | OUTPATIENT
Start: 2024-06-03 | End: 2024-06-03 | Stop reason: HOSPADM

## 2024-06-03 RX ORDER — LIDOCAINE HYDROCHLORIDE 20 MG/ML
INJECTION, SOLUTION EPIDURAL; INFILTRATION; INTRACAUDAL; PERINEURAL PRN
Status: DISCONTINUED | OUTPATIENT
Start: 2024-06-03 | End: 2024-06-03 | Stop reason: SDUPTHER

## 2024-06-03 RX ORDER — ROCURONIUM BROMIDE 10 MG/ML
INJECTION, SOLUTION INTRAVENOUS PRN
Status: DISCONTINUED | OUTPATIENT
Start: 2024-06-03 | End: 2024-06-03 | Stop reason: SDUPTHER

## 2024-06-03 RX ORDER — MEPERIDINE HYDROCHLORIDE 25 MG/ML
12.5 INJECTION INTRAMUSCULAR; INTRAVENOUS; SUBCUTANEOUS EVERY 5 MIN PRN
Status: DISCONTINUED | OUTPATIENT
Start: 2024-06-03 | End: 2024-06-03 | Stop reason: HOSPADM

## 2024-06-03 RX ORDER — ONDANSETRON 2 MG/ML
INJECTION INTRAMUSCULAR; INTRAVENOUS PRN
Status: DISCONTINUED | OUTPATIENT
Start: 2024-06-03 | End: 2024-06-03 | Stop reason: SDUPTHER

## 2024-06-03 RX ORDER — OXYCODONE HYDROCHLORIDE 5 MG/1
5 TABLET ORAL
Status: COMPLETED | OUTPATIENT
Start: 2024-06-03 | End: 2024-06-03

## 2024-06-03 RX ORDER — PROPOFOL 10 MG/ML
INJECTION, EMULSION INTRAVENOUS PRN
Status: DISCONTINUED | OUTPATIENT
Start: 2024-06-03 | End: 2024-06-03 | Stop reason: SDUPTHER

## 2024-06-03 RX ORDER — MIDAZOLAM HYDROCHLORIDE 1 MG/ML
INJECTION INTRAMUSCULAR; INTRAVENOUS PRN
Status: DISCONTINUED | OUTPATIENT
Start: 2024-06-03 | End: 2024-06-03 | Stop reason: SDUPTHER

## 2024-06-03 RX ORDER — IPRATROPIUM BROMIDE AND ALBUTEROL SULFATE 2.5; .5 MG/3ML; MG/3ML
1 SOLUTION RESPIRATORY (INHALATION)
Status: DISCONTINUED | OUTPATIENT
Start: 2024-06-03 | End: 2024-06-03 | Stop reason: HOSPADM

## 2024-06-03 RX ORDER — ALBUTEROL SULFATE 2.5 MG/3ML
2.5 SOLUTION RESPIRATORY (INHALATION)
Status: DISCONTINUED | OUTPATIENT
Start: 2024-06-03 | End: 2024-06-03 | Stop reason: HOSPADM

## 2024-06-03 RX ORDER — LIDOCAINE HYDROCHLORIDE AND EPINEPHRINE 10; 10 MG/ML; UG/ML
INJECTION, SOLUTION INFILTRATION; PERINEURAL PRN
Status: DISCONTINUED | OUTPATIENT
Start: 2024-06-03 | End: 2024-06-03 | Stop reason: HOSPADM

## 2024-06-03 RX ORDER — LABETALOL HYDROCHLORIDE 5 MG/ML
10 INJECTION, SOLUTION INTRAVENOUS
Status: DISCONTINUED | OUTPATIENT
Start: 2024-06-03 | End: 2024-06-03 | Stop reason: HOSPADM

## 2024-06-03 RX ORDER — OXYCODONE HYDROCHLORIDE AND ACETAMINOPHEN 5; 325 MG/1; MG/1
1 TABLET ORAL EVERY 6 HOURS PRN
Qty: 8 TABLET | Refills: 0 | Status: SHIPPED | OUTPATIENT
Start: 2024-06-03 | End: 2024-06-06

## 2024-06-03 RX ORDER — LIDOCAINE HYDROCHLORIDE 10 MG/ML
1 INJECTION, SOLUTION EPIDURAL; INFILTRATION; INTRACAUDAL; PERINEURAL
Status: DISCONTINUED | OUTPATIENT
Start: 2024-06-03 | End: 2024-06-03 | Stop reason: HOSPADM

## 2024-06-03 RX ORDER — FAMOTIDINE 10 MG/ML
INJECTION, SOLUTION INTRAVENOUS PRN
Status: DISCONTINUED | OUTPATIENT
Start: 2024-06-03 | End: 2024-06-03 | Stop reason: SDUPTHER

## 2024-06-03 RX ORDER — DEXAMETHASONE SODIUM PHOSPHATE 4 MG/ML
INJECTION, SOLUTION INTRA-ARTICULAR; INTRALESIONAL; INTRAMUSCULAR; INTRAVENOUS; SOFT TISSUE PRN
Status: DISCONTINUED | OUTPATIENT
Start: 2024-06-03 | End: 2024-06-03 | Stop reason: SDUPTHER

## 2024-06-03 RX ADMIN — ROCURONIUM BROMIDE 10 MG: 10 INJECTION, SOLUTION INTRAVENOUS at 07:59

## 2024-06-03 RX ADMIN — PROPOFOL 150 MCG/KG/MIN: 10 INJECTION, EMULSION INTRAVENOUS at 07:40

## 2024-06-03 RX ADMIN — LIDOCAINE HYDROCHLORIDE 60 MG: 20 INJECTION, SOLUTION EPIDURAL; INFILTRATION; INTRACAUDAL; PERINEURAL at 07:39

## 2024-06-03 RX ADMIN — ROCURONIUM BROMIDE 40 MG: 10 INJECTION, SOLUTION INTRAVENOUS at 07:39

## 2024-06-03 RX ADMIN — PROPOFOL 150 MG: 10 INJECTION, EMULSION INTRAVENOUS at 07:39

## 2024-06-03 RX ADMIN — DROPERIDOL 0.62 MG: 2.5 INJECTION, SOLUTION INTRAMUSCULAR; INTRAVENOUS at 09:50

## 2024-06-03 RX ADMIN — OXYCODONE HYDROCHLORIDE 5 MG: 5 TABLET ORAL at 09:41

## 2024-06-03 RX ADMIN — MIDAZOLAM HYDROCHLORIDE 1 MG: 1 INJECTION, SOLUTION INTRAMUSCULAR; INTRAVENOUS at 07:57

## 2024-06-03 RX ADMIN — ACETAMINOPHEN 650 MG: 325 TABLET ORAL at 06:09

## 2024-06-03 RX ADMIN — Medication 20 MG: at 07:35

## 2024-06-03 RX ADMIN — ONDANSETRON 4 MG: 2 INJECTION INTRAMUSCULAR; INTRAVENOUS at 07:25

## 2024-06-03 RX ADMIN — SUGAMMADEX 200 MG: 100 INJECTION, SOLUTION INTRAVENOUS at 08:49

## 2024-06-03 RX ADMIN — MIDAZOLAM HYDROCHLORIDE 2 MG: 1 INJECTION, SOLUTION INTRAMUSCULAR; INTRAVENOUS at 07:25

## 2024-06-03 RX ADMIN — FAMOTIDINE 20 MG: 10 INJECTION, SOLUTION INTRAVENOUS at 07:25

## 2024-06-03 RX ADMIN — HYDROMORPHONE HYDROCHLORIDE 1 MG: 2 INJECTION, SOLUTION INTRAMUSCULAR; INTRAVENOUS; SUBCUTANEOUS at 07:25

## 2024-06-03 RX ADMIN — SUGAMMADEX 200 MG: 100 INJECTION, SOLUTION INTRAVENOUS at 08:37

## 2024-06-03 RX ADMIN — MIDAZOLAM HYDROCHLORIDE 1 MG: 1 INJECTION, SOLUTION INTRAMUSCULAR; INTRAVENOUS at 07:35

## 2024-06-03 RX ADMIN — SODIUM CHLORIDE, POTASSIUM CHLORIDE, SODIUM LACTATE AND CALCIUM CHLORIDE: 600; 310; 30; 20 INJECTION, SOLUTION INTRAVENOUS at 08:27

## 2024-06-03 RX ADMIN — SODIUM CHLORIDE, POTASSIUM CHLORIDE, SODIUM LACTATE AND CALCIUM CHLORIDE: 600; 310; 30; 20 INJECTION, SOLUTION INTRAVENOUS at 06:04

## 2024-06-03 RX ADMIN — DEXAMETHASONE SODIUM PHOSPHATE 4 MG: 4 INJECTION, SOLUTION INTRAMUSCULAR; INTRAVENOUS at 07:47

## 2024-06-03 ASSESSMENT — PAIN DESCRIPTION - PAIN TYPE
TYPE: SURGICAL PAIN
TYPE: SURGICAL PAIN

## 2024-06-03 ASSESSMENT — PAIN DESCRIPTION - FREQUENCY: FREQUENCY: CONTINUOUS

## 2024-06-03 ASSESSMENT — PAIN DESCRIPTION - DESCRIPTORS
DESCRIPTORS: ACHING
DESCRIPTORS: ACHING

## 2024-06-03 ASSESSMENT — PAIN DESCRIPTION - LOCATION
LOCATION: ABDOMEN
LOCATION: ABDOMEN

## 2024-06-03 ASSESSMENT — PAIN SCALES - GENERAL
PAINLEVEL_OUTOF10: 8
PAINLEVEL_OUTOF10: 6

## 2024-06-03 ASSESSMENT — PAIN - FUNCTIONAL ASSESSMENT: PAIN_FUNCTIONAL_ASSESSMENT: 0-10

## 2024-06-03 NOTE — OP NOTE
well and was taken to the recovery room in stable condition.            MD VASILE CHAVARRIA/DON  D:  06/03/2024 08:44:38  T:  06/03/2024 10:02:43  JOB #:  [id number]

## 2024-06-03 NOTE — ANESTHESIA POSTPROCEDURE EVALUATION
Department of Anesthesiology  Postprocedure Note    Patient: Shana Meier  MRN: [id number]  YOB: 1985  Date of evaluation: 6/3/2024    Procedure Summary       Date: 06/03/24 Room / Location: [facility] ASU OR  / [facility] AMBULATORY OR    Anesthesia Start: 0725 Anesthesia Stop: 0908    Procedures:       LAPAROSCOPIC BILATERAL SALPINGECTOMY, LOOP ELECTROSURGICAL EXCISION PROCEDURE (LEEP) (Abdomen/Perineum)      . (Cervix) Diagnosis:       High grade squamous intraepithelial lesion on cytologic smear of cervix (HGSIL)      Sterilization      (High grade squamous intraepithelial lesion on cytologic smear of cervix (HGSIL) [R87.613])      (Sterilization [Z30.2])    Surgeons: Mona Spence MD Responsible Provider: Don Alvarado MD    Anesthesia Type: general ASA Status: 2            Anesthesia Type: No value filed.    Jaymie Phase I: Jaymie Score: 8    Jaymie Phase II: Jaymie Score: 10    Anesthesia Post Evaluation    No notable events documented.

## 2024-06-03 NOTE — ANESTHESIA PRE PROCEDURE
Problem List   Diagnosis Code   • Gastroesophageal reflux disease without esophagitis K21.9   • Sickle cell trait (HCC) D57.3   • Seasonal allergic rhinitis due to pollen J30.1   • Depression F32.A   • Anxiety F41.9   • Stress F43.9       Past Medical History:        Diagnosis Date   • Anxiety    • Asthma     as a child   • COVID-19 2022   • Family history of blood clots     2017 Mother  from Pulmonary Embolism (Sickle Cell Trait)   • GERD (gastroesophageal reflux disease)    • Obesity    • Psychiatric disorder    • Sickle-cell disease, unspecified     trait       Past Surgical History:        Procedure Laterality Date   • CARPAL TUNNEL RELEASE Left    • CYST INCISION AND DRAINAGE      LABIA/ and D&C for Endometrial Polyps       Social History:    Social History     Tobacco Use   • Smoking status: Never   • Smokeless tobacco: Never   Substance Use Topics   • Alcohol use: Yes     Comment: 1x month                                Counseling given: Not Answered      Vital Signs (Current):   Vitals:    24 0601   BP: 113/77   Pulse: 71   Resp: (!) 73   Temp: 98.5 °F (36.9 °C)   TempSrc: Oral   SpO2: 100%   Weight: 99.4 kg (219 lb 2.2 oz)   Height: 1.651 m (5' 5\")                                              BP Readings from Last 3 Encounters:   24 113/77   24 125/80   24 120/80       NPO Status: Time of last liquid consumption:                         Time of last solid consumption:                         Date of last liquid consumption: 24                        Date of last solid food consumption: 24    BMI:   Wt Readings from Last 3 Encounters:   24 99.4 kg (219 lb 2.2 oz)   24 98.4 kg (217 lb)   24 97.1 kg (214 lb)     Body mass index is 36.47 kg/m².    CBC:   Lab Results   Component Value Date/Time    WBC 9.8 2024 01:24 PM    RBC 4.77 2024 01:24 PM    HGB 11.2 2024 01:24 PM    HCT 35.7 2024 01:24 PM    MCV 75 2024

## 2024-06-03 NOTE — BRIEF OP NOTE
Brief Postoperative Note      Patient: Shana Meier  YOB: 1985  MRN: [id number]    Date of Procedure: 6/3/2024    Pre-Op Diagnosis Codes:     * High grade squamous intraepithelial lesion on cytologic smear of cervix (HGSIL) [R87.613]     * Sterilization [Z30.2]  Family history of breast/ovarian cancer    Post-Op Diagnosis: Same       Procedure(s):  LAPAROSCOPIC BILATERAL SALPINGECTOMY, LOOP ELECTROSURGICAL EXCISION PROCEDURE (LEEP)  .    Surgeon(s):  Mona Spence MD    Assistant:  Surgical Assistant: Fili Becker    Anesthesia: General    Estimated Blood Loss (mL): Minimal    Complications: None    Specimens:   ID Type Source Tests Collected by Time Destination   1 : LEEP marked with ink at 12 oclock Tissue Cervix SURGICAL PATHOLOGY Mona Spence MD 6/3/2024 0822    2 : Endocervical Top Hat Tissue Cervix SURGICAL PATHOLOGY Mona Spence MD 6/3/2024 0824    A :   Fallopian Tube  Mona Spence MD 6/3/2024 0825        Implants:  * No implants in log *      Drains:   Urinary Catheter 06/03/24 2 Way;Shelby (Active)   $ Urethral catheter insertion Inserted for procedure 06/03/24 0812   Catheter Indications Perioperative use for selected surgical procedures 06/03/24 0812   Urine Appearance Clear 06/03/24 0812   Collection Container Standard 06/03/24 0812   Securement Method Other (Comment) 06/03/24 0812   Catheter Care  Perineal wipes 06/03/24 0812   Catheter Best Practices  Drainage tube clipped to bed;Bag below bladder;Bag not on floor;Tamper seal intact;Drainage bag less than half full;Lack of dependent loop in tubing 06/03/24 0812   Status Draining 06/03/24 0812       Findings:  Normal uterus/tubes/ovaries ; normal intraabdominal survey   Hemostasis  Lugol's negative area of the cervix       Electronically signed by Mona Spence MD on 6/3/2024 at 8:28 AM

## 2024-06-03 NOTE — DISCHARGE INSTRUCTIONS
surgeon,   - OR -       your surgeon has already provided these to you during a previous/pre-op office visit)  []     [facility] Education Information  []     Physical Therapy Prescription  []     Follow-up Appointment Cards  []     Surgery-related Pictures/Media  []     Pain block and/or block with On-Q Catheter from Anesthesia Service (information included in your instructions above)  []     Medical device information sheets/pamphlets from their    []     School/work excuse note.  []     /parent work excuse note.

## 2024-08-02 ENCOUNTER — HOSPITAL ENCOUNTER (OUTPATIENT)
Facility: HOSPITAL | Age: 39
Discharge: HOME | End: 2024-08-02
Payer: COMMERCIAL

## 2024-08-02 VITALS
BODY MASS INDEX: 35.22 KG/M2 | HEART RATE: 73 BPM | WEIGHT: 219.14 LBS | HEIGHT: 66 IN | DIASTOLIC BLOOD PRESSURE: 92 MMHG | RESPIRATION RATE: 16 BRPM | SYSTOLIC BLOOD PRESSURE: 127 MMHG | OXYGEN SATURATION: 98 %

## 2024-08-02 LAB
ABO + RH BLD: NORMAL
BASOPHILS # BLD: 0.1 K/UL (ref 0–0.1)
BASOPHILS NFR BLD: 1 % (ref 0–1)
BLOOD GROUP ANTIBODIES SERPL: NORMAL
DIFFERENTIAL METHOD BLD: ABNORMAL
EOSINOPHIL # BLD: 0.3 K/UL (ref 0–0.4)
EOSINOPHIL NFR BLD: 3 % (ref 0–7)
ERYTHROCYTE [DISTWIDTH] IN BLOOD BY AUTOMATED COUNT: 16.8 % (ref 11.5–14.5)
HCT VFR BLD AUTO: 36.9 % (ref 35–47)
HGB BLD-MCNC: 11.8 G/DL (ref 11.5–16)
IMM GRANULOCYTES # BLD AUTO: 0 K/UL (ref 0–0.04)
IMM GRANULOCYTES NFR BLD AUTO: 0 % (ref 0–0.5)
LYMPHOCYTES # BLD: 3 K/UL (ref 0.8–3.5)
LYMPHOCYTES NFR BLD: 30 % (ref 12–49)
MCH RBC QN AUTO: 23 PG (ref 26–34)
MCHC RBC AUTO-ENTMCNC: 32 G/DL (ref 30–36.5)
MCV RBC AUTO: 71.8 FL (ref 80–99)
MONOCYTES # BLD: 0.7 K/UL (ref 0–1)
MONOCYTES NFR BLD: 7 % (ref 5–13)
NEUTS SEG # BLD: 5.8 K/UL (ref 1.8–8)
NEUTS SEG NFR BLD: 59 % (ref 32–75)
NRBC # BLD: 0 K/UL (ref 0–0.01)
NRBC BLD-RTO: 0 PER 100 WBC
PLATELET # BLD AUTO: 362 K/UL (ref 150–400)
PMV BLD AUTO: 11.3 FL (ref 8.9–12.9)
RBC # BLD AUTO: 5.14 M/UL (ref 3.8–5.2)
SPECIMEN EXP DATE BLD: NORMAL
WBC # BLD AUTO: 9.8 K/UL (ref 3.6–11)

## 2024-08-02 PROCEDURE — 86900 BLOOD TYPING SEROLOGIC ABO: CPT

## 2024-08-02 PROCEDURE — 86850 RBC ANTIBODY SCREEN: CPT

## 2024-08-02 PROCEDURE — 36415 COLL VENOUS BLD VENIPUNCTURE: CPT

## 2024-08-02 PROCEDURE — 85025 COMPLETE CBC W/AUTO DIFF WBC: CPT

## 2024-08-02 PROCEDURE — 86901 BLOOD TYPING SEROLOGIC RH(D): CPT

## 2024-08-02 NOTE — DISCHARGE INSTRUCTIONS
[facility]                   [address] [place] [address]   PRE-ADMISSION TESTING    [phone]     Surgery Date:  8/8/2024 Thursday       Is surgery arrival time given by surgeon?  NO  If “NO”, [facility] staff will call you between 3 and 7pm the day before your surgery with your arrival time. (If your surgery is on a Monday, we will call you the Friday before.)    Call [phone] after 7pm Monday-Friday if you did not receive this call.    INSTRUCTIONS BEFORE YOUR SURGERY   When You  Arrive    Arrive at the 2nd Floor Admitting Desk on the day of your surgery     Have your insurance card, photo ID, and any copayment (if needed)   Food   and   Drink    NO food or drink after midnight the night before surgery       This means NO water, gum, mints, coffee, juice, etc.     No alcohol (beer, wine, liquor) 24 hours before and after surgery   Medications to TAKE Morning of Surgery      MEDICATIONS TO TAKE THE MORNING OF SURGERY WITH A SMALL SIP OF WATER:          Yrtec; Flonase; please bring your rescue inhaler with you to the hospital          Medications to STOP 7 Days Before Surgery Non-Steroidal anti-inflammatory Drugs (NSAID's): for example: Ibuprofen, Advil, Motrin, Naproxen, Aleve  Aspirin, if taking for pain  Herbal supplements, vitamins, and fish oil  Other:  (Pain medications not listed above, including Tylenol, may be taken)   Blood Thinners    If you take Aspirin, Plavix, Coumadin, or any blood-thinning or anti blood-clotting medicine, talk to the doctor who prescribed the medications for pre-operative instructions.    Bathing   Clothing  Jewelry  Valuables       If you shower the morning of surgery, please do not apply anything to your skin (lotions, powders, deodorant, or makeup, especially mascara)  Follow Chlorhexidine Care Fusion body wash instructions provided to you during PAT appointment. Begin 3 days prior to surgery.  Do not shave or trim anywhere 24 hours

## 2024-08-08 ENCOUNTER — ANESTHESIA EVENT (OUTPATIENT)
Facility: HOSPITAL | Age: 39
End: 2024-08-08
Payer: COMMERCIAL

## 2024-08-08 ENCOUNTER — ANESTHESIA (OUTPATIENT)
Facility: HOSPITAL | Age: 39
End: 2024-08-08
Payer: COMMERCIAL

## 2024-08-08 ENCOUNTER — HOSPITAL ENCOUNTER (OUTPATIENT)
Facility: HOSPITAL | Age: 39
Discharge: HOME | End: 2024-08-09
Attending: OBSTETRICS & GYNECOLOGY | Admitting: OBSTETRICS & GYNECOLOGY
Payer: COMMERCIAL

## 2024-08-08 DIAGNOSIS — Z98.890 STATUS POST SURGERY: Primary | ICD-10-CM

## 2024-08-08 PROBLEM — D06.9 CIN III (CERVICAL INTRAEPITHELIAL NEOPLASIA GRADE III) WITH SEVERE DYSPLASIA: Status: ACTIVE | Noted: 2024-08-08

## 2024-08-08 LAB — HCG UR QL: NEGATIVE

## 2024-08-08 PROCEDURE — 6360000002 HC RX W HCPCS: Performed by: OBSTETRICS & GYNECOLOGY

## 2024-08-08 PROCEDURE — 3700000001 HC ADD 15 MINUTES (ANESTHESIA): Performed by: OBSTETRICS & GYNECOLOGY

## 2024-08-08 PROCEDURE — 7100000000 HC PACU RECOVERY - FIRST 15 MIN: Performed by: OBSTETRICS & GYNECOLOGY

## 2024-08-08 PROCEDURE — 88309 TISSUE EXAM BY PATHOLOGIST: CPT

## 2024-08-08 PROCEDURE — 2720000010 HC SURG SUPPLY STERILE: Performed by: OBSTETRICS & GYNECOLOGY

## 2024-08-08 PROCEDURE — 2580000003 HC RX 258: Performed by: ANESTHESIOLOGY

## 2024-08-08 PROCEDURE — 81025 URINE PREGNANCY TEST: CPT

## 2024-08-08 PROCEDURE — 6370000000 HC RX 637 (ALT 250 FOR IP): Performed by: OBSTETRICS & GYNECOLOGY

## 2024-08-08 PROCEDURE — 6360000002 HC RX W HCPCS: Performed by: ANESTHESIOLOGY

## 2024-08-08 PROCEDURE — 3600000014 HC SURGERY LEVEL 4 ADDTL 15MIN: Performed by: OBSTETRICS & GYNECOLOGY

## 2024-08-08 PROCEDURE — 3700000000 HC ANESTHESIA ATTENDED CARE: Performed by: OBSTETRICS & GYNECOLOGY

## 2024-08-08 PROCEDURE — 2500000003 HC RX 250 WO HCPCS: Performed by: ANESTHESIOLOGY

## 2024-08-08 PROCEDURE — 6360000002 HC RX W HCPCS: Performed by: NURSE ANESTHETIST, CERTIFIED REGISTERED

## 2024-08-08 PROCEDURE — 2709999900 HC NON-CHARGEABLE SUPPLY: Performed by: OBSTETRICS & GYNECOLOGY

## 2024-08-08 PROCEDURE — 2500000003 HC RX 250 WO HCPCS: Performed by: NURSE ANESTHETIST, CERTIFIED REGISTERED

## 2024-08-08 PROCEDURE — 94761 N-INVAS EAR/PLS OXIMETRY MLT: CPT

## 2024-08-08 PROCEDURE — 88307 TISSUE EXAM BY PATHOLOGIST: CPT

## 2024-08-08 PROCEDURE — 7100000001 HC PACU RECOVERY - ADDTL 15 MIN: Performed by: OBSTETRICS & GYNECOLOGY

## 2024-08-08 PROCEDURE — 2500000003 HC RX 250 WO HCPCS: Performed by: OBSTETRICS & GYNECOLOGY

## 2024-08-08 PROCEDURE — 3600000004 HC SURGERY LEVEL 4 BASE: Performed by: OBSTETRICS & GYNECOLOGY

## 2024-08-08 RX ORDER — SODIUM CHLORIDE, SODIUM LACTATE, POTASSIUM CHLORIDE, CALCIUM CHLORIDE 600; 310; 30; 20 MG/100ML; MG/100ML; MG/100ML; MG/100ML
INJECTION, SOLUTION INTRAVENOUS CONTINUOUS
Status: DISCONTINUED | OUTPATIENT
Start: 2024-08-08 | End: 2024-08-08 | Stop reason: HOSPADM

## 2024-08-08 RX ORDER — ESMOLOL HYDROCHLORIDE 10 MG/ML
INJECTION INTRAVENOUS PRN
Status: DISCONTINUED | OUTPATIENT
Start: 2024-08-08 | End: 2024-08-08 | Stop reason: SDUPTHER

## 2024-08-08 RX ORDER — NALOXONE HYDROCHLORIDE 0.4 MG/ML
INJECTION, SOLUTION INTRAMUSCULAR; INTRAVENOUS; SUBCUTANEOUS PRN
Status: DISCONTINUED | OUTPATIENT
Start: 2024-08-08 | End: 2024-08-08 | Stop reason: HOSPADM

## 2024-08-08 RX ORDER — LIDOCAINE HYDROCHLORIDE 20 MG/ML
INJECTION, SOLUTION EPIDURAL; INFILTRATION; INTRACAUDAL; PERINEURAL PRN
Status: DISCONTINUED | OUTPATIENT
Start: 2024-08-08 | End: 2024-08-08 | Stop reason: SDUPTHER

## 2024-08-08 RX ORDER — KETOROLAC TROMETHAMINE 30 MG/ML
INJECTION, SOLUTION INTRAMUSCULAR; INTRAVENOUS PRN
Status: DISCONTINUED | OUTPATIENT
Start: 2024-08-08 | End: 2024-08-08 | Stop reason: SDUPTHER

## 2024-08-08 RX ORDER — ONDANSETRON 2 MG/ML
INJECTION INTRAMUSCULAR; INTRAVENOUS PRN
Status: DISCONTINUED | OUTPATIENT
Start: 2024-08-08 | End: 2024-08-08 | Stop reason: SDUPTHER

## 2024-08-08 RX ORDER — HYDROMORPHONE HYDROCHLORIDE 2 MG/ML
INJECTION, SOLUTION INTRAMUSCULAR; INTRAVENOUS; SUBCUTANEOUS PRN
Status: DISCONTINUED | OUTPATIENT
Start: 2024-08-08 | End: 2024-08-08 | Stop reason: SDUPTHER

## 2024-08-08 RX ORDER — DIPHENHYDRAMINE HYDROCHLORIDE 50 MG/ML
12.5 INJECTION INTRAMUSCULAR; INTRAVENOUS
Status: DISCONTINUED | OUTPATIENT
Start: 2024-08-08 | End: 2024-08-08 | Stop reason: HOSPADM

## 2024-08-08 RX ORDER — ONDANSETRON 2 MG/ML
4 INJECTION INTRAMUSCULAR; INTRAVENOUS EVERY 6 HOURS PRN
Status: DISCONTINUED | OUTPATIENT
Start: 2024-08-08 | End: 2024-08-09 | Stop reason: HOSPADM

## 2024-08-08 RX ORDER — PROPOFOL 10 MG/ML
INJECTION, EMULSION INTRAVENOUS CONTINUOUS PRN
Status: DISCONTINUED | OUTPATIENT
Start: 2024-08-08 | End: 2024-08-08 | Stop reason: SDUPTHER

## 2024-08-08 RX ORDER — LIDOCAINE HYDROCHLORIDE 10 MG/ML
1 INJECTION, SOLUTION EPIDURAL; INFILTRATION; INTRACAUDAL; PERINEURAL
Status: DISCONTINUED | OUTPATIENT
Start: 2024-08-08 | End: 2024-08-08 | Stop reason: HOSPADM

## 2024-08-08 RX ORDER — BUPIVACAINE HYDROCHLORIDE AND EPINEPHRINE 5; 5 MG/ML; UG/ML
INJECTION, SOLUTION PERINEURAL PRN
Status: DISCONTINUED | OUTPATIENT
Start: 2024-08-08 | End: 2024-08-08 | Stop reason: HOSPADM

## 2024-08-08 RX ORDER — ONDANSETRON 2 MG/ML
4 INJECTION INTRAMUSCULAR; INTRAVENOUS
Status: COMPLETED | OUTPATIENT
Start: 2024-08-08 | End: 2024-08-08

## 2024-08-08 RX ORDER — OXYCODONE HYDROCHLORIDE AND ACETAMINOPHEN 5; 325 MG/1; MG/1
1 TABLET ORAL EVERY 4 HOURS PRN
Status: DISCONTINUED | OUTPATIENT
Start: 2024-08-08 | End: 2024-08-09 | Stop reason: HOSPADM

## 2024-08-08 RX ORDER — SODIUM CHLORIDE, SODIUM LACTATE, POTASSIUM CHLORIDE, CALCIUM CHLORIDE 600; 310; 30; 20 MG/100ML; MG/100ML; MG/100ML; MG/100ML
INJECTION, SOLUTION INTRAVENOUS CONTINUOUS
Status: DISCONTINUED | OUTPATIENT
Start: 2024-08-08 | End: 2024-08-08

## 2024-08-08 RX ORDER — MIDAZOLAM HYDROCHLORIDE 1 MG/ML
INJECTION INTRAMUSCULAR; INTRAVENOUS PRN
Status: DISCONTINUED | OUTPATIENT
Start: 2024-08-08 | End: 2024-08-08 | Stop reason: SDUPTHER

## 2024-08-08 RX ORDER — MIDAZOLAM HYDROCHLORIDE 2 MG/2ML
2 INJECTION, SOLUTION INTRAMUSCULAR; INTRAVENOUS
Status: DISCONTINUED | OUTPATIENT
Start: 2024-08-08 | End: 2024-08-08 | Stop reason: HOSPADM

## 2024-08-08 RX ORDER — METRONIDAZOLE 500 MG/100ML
500 INJECTION, SOLUTION INTRAVENOUS ONCE
Status: COMPLETED | OUTPATIENT
Start: 2024-08-08 | End: 2024-08-08

## 2024-08-08 RX ORDER — DEXAMETHASONE SODIUM PHOSPHATE 4 MG/ML
INJECTION, SOLUTION INTRA-ARTICULAR; INTRALESIONAL; INTRAMUSCULAR; INTRAVENOUS; SOFT TISSUE PRN
Status: DISCONTINUED | OUTPATIENT
Start: 2024-08-08 | End: 2024-08-08 | Stop reason: SDUPTHER

## 2024-08-08 RX ORDER — OXYCODONE HYDROCHLORIDE AND ACETAMINOPHEN 5; 325 MG/1; MG/1
1 TABLET ORAL EVERY 6 HOURS PRN
Qty: 12 TABLET | Refills: 0 | Status: SHIPPED | OUTPATIENT
Start: 2024-08-08 | End: 2024-08-11

## 2024-08-08 RX ORDER — ROCURONIUM BROMIDE 10 MG/ML
INJECTION, SOLUTION INTRAVENOUS PRN
Status: DISCONTINUED | OUTPATIENT
Start: 2024-08-08 | End: 2024-08-08 | Stop reason: SDUPTHER

## 2024-08-08 RX ORDER — FENTANYL CITRATE 50 UG/ML
100 INJECTION, SOLUTION INTRAMUSCULAR; INTRAVENOUS
Status: DISCONTINUED | OUTPATIENT
Start: 2024-08-08 | End: 2024-08-08 | Stop reason: HOSPADM

## 2024-08-08 RX ADMIN — HYDROMORPHONE HYDROCHLORIDE 0.5 MG: 2 INJECTION, SOLUTION INTRAMUSCULAR; INTRAVENOUS; SUBCUTANEOUS at 11:11

## 2024-08-08 RX ADMIN — SODIUM CHLORIDE, POTASSIUM CHLORIDE, SODIUM LACTATE AND CALCIUM CHLORIDE: 600; 310; 30; 20 INJECTION, SOLUTION INTRAVENOUS at 09:15

## 2024-08-08 RX ADMIN — FAMOTIDINE 20 MG: 10 INJECTION INTRAVENOUS at 15:19

## 2024-08-08 RX ADMIN — ESMOLOL HYDROCHLORIDE 10 MG: 10 INJECTION, SOLUTION INTRAVENOUS at 11:57

## 2024-08-08 RX ADMIN — DEXAMETHASONE SODIUM PHOSPHATE 8 MG: 4 INJECTION INTRA-ARTICULAR; INTRALESIONAL; INTRAMUSCULAR; INTRAVENOUS; SOFT TISSUE at 11:39

## 2024-08-08 RX ADMIN — OXYCODONE AND ACETAMINOPHEN 1 TABLET: 5; 325 TABLET ORAL at 22:47

## 2024-08-08 RX ADMIN — METRONIDAZOLE 500 MG: 500 INJECTION, SOLUTION INTRAVENOUS at 11:30

## 2024-08-08 RX ADMIN — ONDANSETRON 4 MG: 2 INJECTION INTRAMUSCULAR; INTRAVENOUS at 13:16

## 2024-08-08 RX ADMIN — ROCURONIUM BROMIDE 10 MG: 10 INJECTION, SOLUTION INTRAVENOUS at 12:08

## 2024-08-08 RX ADMIN — MIDAZOLAM HYDROCHLORIDE 2 MG: 1 INJECTION, SOLUTION INTRAMUSCULAR; INTRAVENOUS at 11:10

## 2024-08-08 RX ADMIN — ROCURONIUM BROMIDE 20 MG: 10 INJECTION, SOLUTION INTRAVENOUS at 11:49

## 2024-08-08 RX ADMIN — LIDOCAINE HYDROCHLORIDE 60 MG: 20 INJECTION, SOLUTION EPIDURAL; INFILTRATION; INTRACAUDAL; PERINEURAL at 11:18

## 2024-08-08 RX ADMIN — HYDROMORPHONE HYDROCHLORIDE 0.5 MG: 1 INJECTION, SOLUTION INTRAMUSCULAR; INTRAVENOUS; SUBCUTANEOUS at 13:43

## 2024-08-08 RX ADMIN — KETOROLAC TROMETHAMINE 30 MG: 30 INJECTION, SOLUTION INTRAMUSCULAR at 12:27

## 2024-08-08 RX ADMIN — IBUPROFEN 600 MG: 200 TABLET, FILM COATED ORAL at 20:06

## 2024-08-08 RX ADMIN — HYDROMORPHONE HYDROCHLORIDE 0.5 MG: 2 INJECTION, SOLUTION INTRAMUSCULAR; INTRAVENOUS; SUBCUTANEOUS at 12:50

## 2024-08-08 RX ADMIN — SUGAMMADEX 400 MG: 100 INJECTION, SOLUTION INTRAVENOUS at 12:29

## 2024-08-08 RX ADMIN — ROCURONIUM BROMIDE 50 MG: 10 INJECTION, SOLUTION INTRAVENOUS at 11:19

## 2024-08-08 RX ADMIN — HYDROMORPHONE HYDROCHLORIDE 0.5 MG: 2 INJECTION, SOLUTION INTRAMUSCULAR; INTRAVENOUS; SUBCUTANEOUS at 11:15

## 2024-08-08 RX ADMIN — HYDROMORPHONE HYDROCHLORIDE 0.5 MG: 1 INJECTION, SOLUTION INTRAMUSCULAR; INTRAVENOUS; SUBCUTANEOUS at 13:28

## 2024-08-08 RX ADMIN — SODIUM CHLORIDE, POTASSIUM CHLORIDE, SODIUM LACTATE AND CALCIUM CHLORIDE: 600; 310; 30; 20 INJECTION, SOLUTION INTRAVENOUS at 12:28

## 2024-08-08 RX ADMIN — PROPOFOL 100 MCG/KG/MIN: 10 INJECTION, EMULSION INTRAVENOUS at 11:24

## 2024-08-08 RX ADMIN — ONDANSETRON 4 MG: 2 INJECTION INTRAMUSCULAR; INTRAVENOUS at 12:27

## 2024-08-08 RX ADMIN — HYDROMORPHONE HYDROCHLORIDE 0.5 MG: 2 INJECTION, SOLUTION INTRAMUSCULAR; INTRAVENOUS; SUBCUTANEOUS at 11:26

## 2024-08-08 RX ADMIN — OXYCODONE AND ACETAMINOPHEN 1 TABLET: 5; 325 TABLET ORAL at 17:05

## 2024-08-08 RX ADMIN — ESMOLOL HYDROCHLORIDE 10 MG: 10 INJECTION, SOLUTION INTRAVENOUS at 11:42

## 2024-08-08 RX ADMIN — PROPOFOL 200 MG: 10 INJECTION, EMULSION INTRAVENOUS at 11:18

## 2024-08-08 ASSESSMENT — PAIN DESCRIPTION - PAIN TYPE
TYPE: SURGICAL PAIN
TYPE: SURGICAL PAIN

## 2024-08-08 ASSESSMENT — PAIN DESCRIPTION - DESCRIPTORS
DESCRIPTORS: ACHING
DESCRIPTORS: SORE;ACHING
DESCRIPTORS: ACHING

## 2024-08-08 ASSESSMENT — PAIN SCALES - GENERAL
PAINLEVEL_OUTOF10: 9
PAINLEVEL_OUTOF10: 3
PAINLEVEL_OUTOF10: 8
PAINLEVEL_OUTOF10: 5
PAINLEVEL_OUTOF10: 5
PAINLEVEL_OUTOF10: 9
PAINLEVEL_OUTOF10: 2

## 2024-08-08 ASSESSMENT — PAIN DESCRIPTION - LOCATION
LOCATION: ABDOMEN
LOCATION: VAGINA
LOCATION: ABDOMEN
LOCATION: ABDOMEN
LOCATION: HEAD

## 2024-08-08 ASSESSMENT — PAIN DESCRIPTION - ORIENTATION
ORIENTATION: ANTERIOR
ORIENTATION: ANTERIOR

## 2024-08-08 ASSESSMENT — PAIN - FUNCTIONAL ASSESSMENT: PAIN_FUNCTIONAL_ASSESSMENT: 0-10

## 2024-08-08 NOTE — PLAN OF CARE
Problem: Pain  Goal: Verbalizes/displays adequate comfort level or baseline comfort level  Outcome: HH/HSPC Progressing     Problem: Discharge Planning  Goal: Discharge to home or other facility with appropriate resources  Outcome: HH/HSPC Progressing

## 2024-08-08 NOTE — BRIEF OP NOTE
Brief Postoperative Note      Patient: Shana Meier  YOB: 1985  MRN: [id number]    Date of Procedure: 8/8/2024    Pre-Op Diagnosis Codes:     * ARAVIND III (cervical intraepithelial neoplasia III) [D06.9]    Post-Op Diagnosis: Same       Procedure(s):  TOTAL LAPAROSCOPIC HYSTERECTOMY    Surgeon(s):  Mona Spence MD Lucas, Kathryn D, MD    Anesthesia: General    Estimated Blood Loss (mL): 50ml     Complications: None    Specimens:   ID Type Source Tests Collected by Time Destination   1 : uterus and cervix Tissue Uterus SURGICAL PATHOLOGY Mona Spence MD 8/8/2024 1222        Implants:  * No implants in log *      Drains:   [REMOVED] Urinary Catheter 08/08/24 Shelby (Removed)   Catheter Indications Perioperative use for selected surgical procedures 08/08/24 1234   Site Assessment No urethral drainage 08/08/24 1234   Urine Color Yellow 08/08/24 1234   Urine Appearance Clear 08/08/24 1234   Collection Container Standard 08/08/24 1234   Securement Method Other (Comment) 08/08/24 1234   Catheter Best Practices  Drainage bag less than half full;Lack of dependent loop in tubing;Tamper seal intact;Drainage tube clipped to bed;Bag below bladder;Bag not on floor 08/08/24 1234   Status Draining;Patent 08/08/24 1234       Findings:  Normal uterus and ovaries.   Normal peristalsing ureters  Hemostasis     Electronically signed by Mona Spence MD on 8/8/2024 at 12:42 PM

## 2024-08-08 NOTE — OP NOTE
[facility]          [address] [place]  [address]                            OPERATIVE REPORT      PATIENT NAME: REGINALD PINEDA               : 1985  MED REC NO: [id number]                       ROOM: [id number]  ACCOUNT NO: [id number]                       ADMIT DATE: 2024  PROVIDER: Mona Spence MD    DATE OF SERVICE:  2024    PREOPERATIVE DIAGNOSES:  ARAVIND 3 with positive endocervical margins.    POSTOPERATIVE DIAGNOSES:  ARAVIND 3 with positive endocervical margins.    PROCEDURES PERFORMED:  Total laparoscopic hysterectomy.    SURGEON:  Mona Spence MD    ASSISTANT:  Lary Unger    ANESTHESIA:  General.    ESTIMATED BLOOD LOSS:  50 mL.    SPECIMENS REMOVED:  Uterus/cervix     INTRAOPERATIVE FINDINGS:  At the time of the procedure were:     1. Normal appearing uterus and ovaries.     2. Prior bilateral salpingectomy.     3. Normal peristalsing ureters.     4. Hemostasis.     COMPLICATIONS:  None.    IMPLANTS:  None.    IV FLUIDS:  1 L.    URINE OUTPUT:  200 mL of clear urine at the end of the case.    BLOOD PRODUCTS:  None.    DRAINS:  Shelby to gravity during the procedure.    DISPOSITION:  To PACU in stable condition.    DESCRIPTION OF PROCEDURE:  After informed consent, the patient was taken to the operating room where general anesthesia was found to be adequate.  She was then prepped and draped in a normal sterile fashion in dorsal supine low lithotomy position using the Yellofin stirrups.  Her arms were tucked at her sides.  Care was taken to avoid hyperflexion on pressure points and a formal time-out was performed.  A Shelby catheter was placed into her bladder.  A speculum was inserted in the vagina and the anterior lip of the cervix was grasped with single-tooth tenaculum.  A uterine manipulator VCare large was placed into the uterus through the cervix and the ring was placed around the cervix.  The tenaculum was removed.  Speculum was

## 2024-08-08 NOTE — ANESTHESIA PRE PROCEDURE
Department of Anesthesiology  Preprocedure Note       Name:  Shana Meier   Age:  39 y.o.  :  1985                                          MRN:  [id number]         Date:  2024      Surgeon: Surgeon(s):  Mona Spence MD Lucas, Kathryn D, MD    Procedure: Procedure(s):  TOTAL LAPAROSCOPIC HYSTERECTOMY    Medications prior to admission:   Prior to Admission medications    Medication Sig Start Date End Date Taking? Authorizing Provider   cetirizine (ZYRTEC) 10 MG tablet Take 1 tablet by mouth daily as needed for Allergies    ProviderJason MD   pantoprazole (PROTONIX) 40 MG tablet Take 1 capsule by mouth Daily with supper    ProviderJason MD   Multiple Vitamins-Minerals (THERAPEUTIC MULTIVITAMIN-MINERALS) tablet Take 2 tablets by mouth daily    Jason Trotter MD   Simethicone (GAS-X PO) Take 1 capsule by mouth as needed    ProviderJason MD   ALBUTEROL IN Inhale 1 puff into the lungs as needed    ProviderJason MD   fluticasone (FLONASE) 50 MCG/ACT nasal spray 2 sprays by Nasal route daily 3/8/24   Azalea Joseph MD   Blood Pressure KIT Has hypertension.I10,check bp twice a day in relaxed position. 3/8/24   Azalea Joseph MD       Current medications:    Current Facility-Administered Medications   Medication Dose Route Frequency Provider Last Rate Last Admin   • lidocaine PF 1 % injection 1 mL  1 mL IntraDERmal Once PRN Ritesh Simon MD       • fentaNYL (SUBLIMAZE) injection 100 mcg  100 mcg IntraVENous Once PRN Ritesh Simon MD       • lactated ringers IV soln infusion   IntraVENous Continuous Ritesh Simon  mL/hr at 24 0925 NoRateChange at 24 0925   • midazolam PF (VERSED) injection 2 mg  2 mg IntraVENous Once PRN Ritesh Simon MD       • metroNIDAZOLE (FLAGYL) 500 mg in 0.9% NaCl 100 mL IVPB premix  500 mg IntraVENous Once Mona Spence MD           Allergies:    Allergies   Allergen Reactions   • Dye [Iodides] Anaphylaxis   •  (219 lb 2.2 oz)   06/03/24 99.4 kg (219 lb 2.2 oz)   05/24/24 98.4 kg (217 lb)     There is no height or weight on file to calculate BMI.    CBC:   Lab Results   Component Value Date/Time    WBC 9.8 08/02/2024 10:27 AM    RBC 5.14 08/02/2024 10:27 AM    HGB 11.8 08/02/2024 10:27 AM    HCT 36.9 08/02/2024 10:27 AM    MCV 71.8 08/02/2024 10:27 AM    RDW 16.8 08/02/2024 10:27 AM     08/02/2024 10:27 AM       CMP:   Lab Results   Component Value Date/Time     03/08/2024 01:24 PM    K 4.1 03/08/2024 01:24 PM     03/08/2024 01:24 PM    CO2 24 03/08/2024 01:24 PM    BUN 9 03/08/2024 01:24 PM    CREATININE 0.75 03/08/2024 01:24 PM    AGRATIO 1.5 03/08/2024 01:24 PM    LABGLOM 104 03/08/2024 01:24 PM    GLUCOSE 76 03/08/2024 01:24 PM    CALCIUM 9.2 03/08/2024 01:24 PM    BILITOT 0.2 03/08/2024 01:24 PM    ALKPHOS 39 03/08/2024 01:24 PM    AST 16 03/08/2024 01:24 PM    ALT 12 03/08/2024 01:24 PM       POC Tests: No results for input(s): \"POCGLU\", \"POCNA\", \"POCK\", \"POCCL\", \"POCBUN\", \"POCHEMO\", \"POCHCT\" in the last 72 hours.    Coags: No results found for: \"PROTIME\", \"INR\", \"APTT\"    HCG (If Applicable):   Lab Results   Component Value Date    PREGTESTUR Negative 08/08/2024        ABGs: No results found for: \"PHART\", \"PO2ART\", \"FTW5WHI\", \"KOO3DIU\", \"BEART\", \"U9OYCMWG\"     Type & Screen (If Applicable):  No results found for: \"LABABO\"    Drug/Infectious Status (If Applicable):  Lab Results   Component Value Date/Time    HEPCAB Non Reactive 03/08/2024 01:24 PM       COVID-19 Screening (If Applicable): No results found for: \"COVID19\"        Anesthesia Evaluation  Patient summary reviewed and Nursing notes reviewed  Airway: Mallampati: III     Neck ROM: full  Mouth opening: > = 3 FB   Dental: normal exam         Pulmonary: breath sounds clear to auscultation  (+)           asthma:                           ROS comment:      Cardiovascular:  Exercise tolerance: good (>4 METS)          Rhythm: regular  Rate:  01-Nov-2019 10:57

## 2024-08-09 VITALS
HEART RATE: 70 BPM | SYSTOLIC BLOOD PRESSURE: 112 MMHG | HEIGHT: 66 IN | WEIGHT: 219 LBS | DIASTOLIC BLOOD PRESSURE: 74 MMHG | TEMPERATURE: 98.1 F | OXYGEN SATURATION: 96 % | RESPIRATION RATE: 17 BRPM | BODY MASS INDEX: 35.2 KG/M2

## 2024-08-09 PROCEDURE — 6370000000 HC RX 637 (ALT 250 FOR IP): Performed by: OBSTETRICS & GYNECOLOGY

## 2024-08-09 RX ADMIN — OXYCODONE AND ACETAMINOPHEN 1 TABLET: 5; 325 TABLET ORAL at 07:35

## 2024-08-09 ASSESSMENT — PAIN DESCRIPTION - ORIENTATION
ORIENTATION: ANTERIOR
ORIENTATION: ANTERIOR

## 2024-08-09 ASSESSMENT — PAIN DESCRIPTION - DESCRIPTORS
DESCRIPTORS: ACHING;SORE
DESCRIPTORS: SORE;ACHING

## 2024-08-09 ASSESSMENT — PAIN DESCRIPTION - LOCATION
LOCATION: ABDOMEN
LOCATION: ABDOMEN

## 2024-08-09 ASSESSMENT — PAIN SCALES - GENERAL
PAINLEVEL_OUTOF10: 3
PAINLEVEL_OUTOF10: 3

## 2024-08-09 NOTE — PLAN OF CARE
Problem: Pain  Goal: Verbalizes/displays adequate comfort level or baseline comfort level  8/9/2024 0115 by Samanta Sotomayor, RN  Outcome: Progressing  8/8/2024 1643 by Luisa Smith, RN  Outcome: /[facility] Progressing     Problem: Discharge Planning  Goal: Discharge to home or other facility with appropriate resources  8/9/2024 0115 by Samanta Sotomayor, RN  Outcome: Progressing  8/8/2024 1643 by Luisa Smith, RN  Outcome: /[facility] Progressing     Problem: Safety - Adult  Goal: Free from fall injury  Outcome: Progressing

## 2024-08-09 NOTE — CARE COORDINATION
8/9/2024  10:52 AM      ICD-10-CM    1. Status post surgery  Z98.890 oxyCODONE-acetaminophen (PERCOCET) 5-325 MG per tablet            General Risk Score: 2   Values used to calculate this score:    Points  Metrics       0        Age: 39       0        Hospital Admissions: 0       1        ED Visits: 1       0        Has Chronic Obstructive Pulmonary Disease: No       0        Has Diabetes: No       0        Has Congestive Heart Failure: No       0        Has Liver Disease: No       1        Has Depression: Yes       0        Current PCP: Azalea Joseph MD       0        Has Medicaid: No      CM reviewed EMR. Pt is independent at baseline and lives with family.      No CM needs indicated at this time. Providers, if needs arise, please consult case management.     INDRA Pollack

## 2024-08-09 NOTE — PLAN OF CARE
Problem: Pain  Goal: Verbalizes/displays adequate comfort level or baseline comfort level  8/9/2024 0752 by Isabel Rajput RN  Outcome: Progressing  8/9/2024 0115 by Samanta Sotomayor RN  Outcome: Progressing     Problem: Discharge Planning  Goal: Discharge to home or other facility with appropriate resources  8/9/2024 0752 by Isabel Rajput RN  Outcome: Progressing  8/9/2024 0115 by Samanta Sotomayor RN  Outcome: Progressing     Problem: Safety - Adult  Goal: Free from fall injury  8/9/2024 0752 by Isabel Rajput RN  Outcome: Progressing  8/9/2024 0115 by Samanta Sotomayor RN  Outcome: Progressing

## 2024-08-09 NOTE — PROGRESS NOTES
Bedside shift change report given to hermila Garcia (oncoming nurse) by hermila England (offgoing nurse). Report included the following information Nurse Handoff Report, Index, ED Encounter Summary, ED SBAR, Adult Overview, Surgery Report, Intake/Output, MAR, Recent Results, Med Rec Status, Cardiac Rhythm and, and Alarm Parameters.

## 2024-08-12 NOTE — ANESTHESIA POSTPROCEDURE EVALUATION
Department of Anesthesiology  Postprocedure Note    Patient: Shana Meier  MRN: [id number]  YOB: 1985  Date of evaluation: 8/12/2024    Procedure Summary       Date: 08/08/24 Room / Location: [facility] ASU OR  / [facility] AMBULATORY OR    Anesthesia Start: 1110 Anesthesia Stop: 1259    Procedure: TOTAL LAPAROSCOPIC HYSTERECTOMY (Abdomen/Perineum) Diagnosis:       ARAVIND III (cervical intraepithelial neoplasia III)      (ARAVIND III (cervical intraepithelial neoplasia III) [D06.9])    Surgeons: Mona Spence MD Responsible Provider: Osbaldo Miner DO    Anesthesia Type: General ASA Status: 2            Anesthesia Type: General    Jaymie Phase I: Jaymie Score: 6    Jaymie Phase II:      Anesthesia Post Evaluation    Comments: Patient discharged by PACU nursing without anesthesiologist evaluation.      No notable events documented.

## 2024-08-26 RX ORDER — LORATADINE 10 MG/1
10 TABLET ORAL DAILY
Qty: 30 TABLET | Refills: 1 | Status: SHIPPED | OUTPATIENT
Start: 2024-08-26

## 2024-09-10 ENCOUNTER — APPOINTMENT (OUTPATIENT)
Facility: HOSPITAL | Age: 39
End: 2024-09-10
Payer: COMMERCIAL

## 2024-09-10 ENCOUNTER — HOSPITAL ENCOUNTER (EMERGENCY)
Facility: HOSPITAL | Age: 39
Discharge: HOME | End: 2024-09-10
Payer: COMMERCIAL

## 2024-09-10 VITALS
SYSTOLIC BLOOD PRESSURE: 120 MMHG | WEIGHT: 217 LBS | RESPIRATION RATE: 18 BRPM | DIASTOLIC BLOOD PRESSURE: 79 MMHG | TEMPERATURE: 98.2 F | OXYGEN SATURATION: 99 % | BODY MASS INDEX: 36.15 KG/M2 | HEIGHT: 65 IN | HEART RATE: 73 BPM

## 2024-09-10 DIAGNOSIS — S39.012A STRAIN OF LUMBAR REGION, INITIAL ENCOUNTER: ICD-10-CM
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
## 2024-09-10 DIAGNOSIS — S93.402A SPRAIN OF LEFT ANKLE, UNSPECIFIED LIGAMENT, INITIAL ENCOUNTER: ICD-10-CM

## 2024-09-10 PROCEDURE — 73552 X-RAY EXAM OF FEMUR 2/>: CPT

## 2024-09-10 PROCEDURE — 73610 X-RAY EXAM OF ANKLE: CPT

## 2024-09-10 PROCEDURE — 73560 X-RAY EXAM OF KNEE 1 OR 2: CPT

## 2024-09-10 PROCEDURE — 99284 EMERGENCY DEPT VISIT MOD MDM: CPT

## 2024-09-10 PROCEDURE — 96372 THER/PROPH/DIAG INJ SC/IM: CPT

## 2024-09-10 PROCEDURE — 72131 CT LUMBAR SPINE W/O DYE: CPT

## 2024-09-10 PROCEDURE — 6360000002 HC RX W HCPCS

## 2024-09-10 RX ORDER — KETOROLAC TROMETHAMINE 30 MG/ML
30 INJECTION, SOLUTION INTRAMUSCULAR; INTRAVENOUS
Status: COMPLETED | OUTPATIENT
Start: 2024-09-10 | End: 2024-09-10

## 2024-09-10 RX ORDER — IBUPROFEN 600 MG/1
600 TABLET, FILM COATED ORAL 3 TIMES DAILY PRN
Qty: 30 TABLET | Refills: 0 | Status: SHIPPED | OUTPATIENT
Start: 2024-09-10

## 2024-09-10 RX ORDER — METHOCARBAMOL 750 MG/1
750 TABLET, FILM COATED ORAL 4 TIMES DAILY
Qty: 40 TABLET | Refills: 0 | Status: SHIPPED | OUTPATIENT
Start: 2024-09-10 | End: 2024-09-20

## 2024-09-10 RX ADMIN — KETOROLAC TROMETHAMINE 30 MG: 30 INJECTION, SOLUTION INTRAMUSCULAR; INTRAVENOUS at 23:05

## 2024-09-10 ASSESSMENT — PAIN - FUNCTIONAL ASSESSMENT
PAIN_FUNCTIONAL_ASSESSMENT: ACTIVITIES ARE NOT PREVENTED
PAIN_FUNCTIONAL_ASSESSMENT: 0-10

## 2024-09-10 ASSESSMENT — PAIN SCALES - GENERAL
PAINLEVEL_OUTOF10: 8
PAINLEVEL_OUTOF10: 6

## 2024-09-10 ASSESSMENT — PAIN DESCRIPTION - PAIN TYPE: TYPE: ACUTE PAIN

## 2024-09-10 ASSESSMENT — PAIN DESCRIPTION - LOCATION: LOCATION: BACK

## 2024-09-10 ASSESSMENT — PAIN DESCRIPTION - ORIENTATION: ORIENTATION: RIGHT

## 2024-09-12 ENCOUNTER — OFFICE VISIT (OUTPATIENT)
Facility: CLINIC | Age: 39
End: 2024-09-12
Payer: COMMERCIAL

## 2024-09-12 VITALS
RESPIRATION RATE: 18 BRPM | DIASTOLIC BLOOD PRESSURE: 80 MMHG | HEART RATE: 72 BPM | SYSTOLIC BLOOD PRESSURE: 110 MMHG | BODY MASS INDEX: 36.25 KG/M2 | WEIGHT: 217.6 LBS | TEMPERATURE: 98.4 F | HEIGHT: 65 IN

## 2024-09-12 DIAGNOSIS — M54.50 ACUTE BILATERAL LOW BACK PAIN WITHOUT SCIATICA: ICD-10-CM

## 2024-09-12 DIAGNOSIS — M25.572 ACUTE LEFT ANKLE PAIN: ICD-10-CM

## 2024-09-12 DIAGNOSIS — Z91.81 H/O FALL: ICD-10-CM

## 2024-09-12 DIAGNOSIS — K21.9 GASTROESOPHAGEAL REFLUX DISEASE WITHOUT ESOPHAGITIS: ICD-10-CM

## 2024-09-12 PROCEDURE — 99214 OFFICE O/P EST MOD 30 MIN: CPT | Performed by: INTERNAL MEDICINE

## 2024-09-12 RX ORDER — HYDROCORTISONE 2.5 %
CREAM (GRAM) TOPICAL
COMMUNITY
Start: 2024-08-23

## 2024-09-12 RX ORDER — BETAMETHASONE DIPROPIONATE 0.5 MG/G
LOTION TOPICAL
COMMUNITY
Start: 2024-08-24

## 2024-09-12 RX ORDER — KETOCONAZOLE 20 MG/G
CREAM TOPICAL
COMMUNITY
Start: 2024-08-23

## 2024-09-12 RX ORDER — TRIAMCINOLONE ACETONIDE 1 MG/G
OINTMENT TOPICAL
COMMUNITY
Start: 2024-07-24

## 2024-09-12 RX ORDER — KETOCONAZOLE 20 MG/ML
SHAMPOO TOPICAL
COMMUNITY
Start: 2024-07-24

## 2024-09-12 ASSESSMENT — ENCOUNTER SYMPTOMS
BACK PAIN: 1
ALLERGIC/IMMUNOLOGIC NEGATIVE: 1
RESPIRATORY NEGATIVE: 1
GASTROINTESTINAL NEGATIVE: 1

## 2024-11-10 ENCOUNTER — HOSPITAL ENCOUNTER (EMERGENCY)
Facility: HOSPITAL | Age: 39
Discharge: HOME | End: 2024-11-10
Payer: COMMERCIAL

## 2024-11-10 VITALS
DIASTOLIC BLOOD PRESSURE: 84 MMHG | WEIGHT: 217 LBS | HEART RATE: 70 BPM | HEIGHT: 65 IN | SYSTOLIC BLOOD PRESSURE: 124 MMHG | OXYGEN SATURATION: 96 % | TEMPERATURE: 98.5 F | RESPIRATION RATE: 15 BRPM | BODY MASS INDEX: 36.15 KG/M2

## 2024-11-10 DIAGNOSIS — J06.9 URI WITH COUGH AND CONGESTION: ICD-10-CM

## 2024-11-10 DIAGNOSIS — J01.10 SUBACUTE FRONTAL SINUSITIS: Primary | ICD-10-CM

## 2024-11-10 LAB
FLUAV RNA SPEC QL NAA+PROBE: NOT DETECTED
FLUBV RNA SPEC QL NAA+PROBE: NOT DETECTED
S PYO DNA THROAT QL NAA+PROBE: NOT DETECTED
SARS-COV-2 RNA RESP QL NAA+PROBE: NOT DETECTED

## 2024-11-10 PROCEDURE — 87636 SARSCOV2 & INF A&B AMP PRB: CPT

## 2024-11-10 PROCEDURE — 87651 STREP A DNA AMP PROBE: CPT

## 2024-11-10 PROCEDURE — 99283 EMERGENCY DEPT VISIT LOW MDM: CPT

## 2024-11-10 RX ORDER — ACETAMINOPHEN 500 MG
1000 TABLET ORAL 4 TIMES DAILY PRN
Qty: 40 TABLET | Refills: 0 | Status: SHIPPED | OUTPATIENT
Start: 2024-11-10

## 2024-11-10 RX ORDER — DEXTROMETHORPHAN HYDROBROMIDE AND PROMETHAZINE HYDROCHLORIDE 15; 6.25 MG/5ML; MG/5ML
2.5 SYRUP ORAL NIGHTLY PRN
Qty: 40 ML | Refills: 0 | Status: SHIPPED | OUTPATIENT
Start: 2024-11-10

## 2024-11-10 RX ORDER — IBUPROFEN 800 MG/1
800 TABLET, FILM COATED ORAL EVERY 8 HOURS PRN
Qty: 20 TABLET | Refills: 0 | Status: SHIPPED | OUTPATIENT
Start: 2024-11-10

## 2024-11-10 RX ORDER — DOXYCYCLINE HYCLATE 100 MG
100 TABLET ORAL 2 TIMES DAILY
Qty: 10 TABLET | Refills: 0 | Status: SHIPPED | OUTPATIENT
Start: 2024-11-10 | End: 2024-11-15

## 2024-11-10 RX ORDER — AZELASTINE 1 MG/ML
1 SPRAY, METERED NASAL 2 TIMES DAILY
Qty: 30 ML | Refills: 0 | Status: SHIPPED | OUTPATIENT
Start: 2024-11-10

## 2024-11-10 ASSESSMENT — PAIN SCALES - GENERAL: PAINLEVEL_OUTOF10: 8

## 2024-11-10 ASSESSMENT — PAIN - FUNCTIONAL ASSESSMENT: PAIN_FUNCTIONAL_ASSESSMENT: 0-10

## 2024-11-10 NOTE — ED PROVIDER NOTES
[facility] EMERGENCY DEPT  EMERGENCY DEPARTMENT HISTORY AND PHYSICAL EXAM      Date: 11/10/2024  Patient Name: Shana Meier  MRN: [id number]  YOB: 1985  Date of evaluation: 11/10/2024  Provider: Bryant Baez PA-C   Note Started: 11:12 AM EST 11/10/24    HISTORY OF PRESENT ILLNESS     Chief Complaint   Patient presents with    Headache    Nasal Congestion       History Provided By: Patient    HPI: Shana Meier is a 39 y.o. female with a past medical history as listed below presents this ED with cc of URI symptoms.  Patient reports a 2-week history of cough, nasal congestion, postnasal drainage, sinus pressure, frontal headaches.  Also with subjective fevers and chills.  Denies treating symptoms anything today.  Specifically denies any chest pain, shortness of breath, palpitations, abdominal pain, nausea, vomiting, changes in bowel or bladder habits, lightheadedness, dizziness, diaphoresis, rashes.  Reports otherwise being well and has no further concerns.    PAST MEDICAL HISTORY   Past Medical History:  Past Medical History:   Diagnosis Date    Anxiety     Asthma     as a child    COVID-19 2022    Family history of blood clots     2017 Mother  from Pulmonary Embolism (Sickle Cell Trait)    GERD (gastroesophageal reflux disease)     Obesity     Psychiatric disorder     Sickle-cell disease, unspecified     trait       Past Surgical History:  Past Surgical History:   Procedure Laterality Date    CARPAL TUNNEL RELEASE Left     CERVIX SURGERY N/A 6/3/2024    LAPAROSCOPIC BILATERAL SALPINGECTOMY, LOOP ELECTROSURGICAL EXCISION PROCEDURE (LEEP) performed by Mona Spence MD at [facility] AMBULATORY OR    CYST INCISION AND DRAINAGE      LABIA/ and D&C for Endometrial Polyps    HYSTERECTOMY (CERVIX STATUS UNKNOWN) N/A 2024    TOTAL LAPAROSCOPIC HYSTERECTOMY performed by Mona Spence MD at [facility] OR    SALPINGO-OOPHORECTOMY N/A 6/3/2024    . performed by Mona Spence MD at [facility] AMBULATORY 
03-Aug-2022 13:02

## 2024-11-12 PROBLEM — M25.572 ACUTE LEFT ANKLE PAIN: Status: RESOLVED | Noted: 2024-09-12 | Resolved: 2024-11-12

## 2024-11-12 PROBLEM — Z12.31 ENCOUNTER FOR SCREENING MAMMOGRAM FOR MALIGNANT NEOPLASM OF BREAST: Status: ACTIVE | Noted: 2024-11-12

## 2024-11-12 PROBLEM — Z13.220 SCREENING FOR LIPID DISORDERS: Status: ACTIVE | Noted: 2024-11-12

## 2024-12-12 PROBLEM — Z13.220 SCREENING FOR LIPID DISORDERS: Status: RESOLVED | Noted: 2024-11-12 | Resolved: 2024-12-12

## 2024-12-12 PROBLEM — Z12.31 ENCOUNTER FOR SCREENING MAMMOGRAM FOR MALIGNANT NEOPLASM OF BREAST: Status: RESOLVED | Noted: 2024-11-12 | Resolved: 2024-12-12

## 2024-12-26 ENCOUNTER — APPOINTMENT (OUTPATIENT)
Facility: HOSPITAL | Age: 39
End: 2024-12-26
Payer: COMMERCIAL

## 2024-12-26 ENCOUNTER — HOSPITAL ENCOUNTER (EMERGENCY)
Facility: HOSPITAL | Age: 39
Discharge: HOME | End: 2024-12-26
Attending: EMERGENCY MEDICINE
Payer: COMMERCIAL

## 2024-12-26 VITALS
DIASTOLIC BLOOD PRESSURE: 93 MMHG | HEIGHT: 65 IN | OXYGEN SATURATION: 100 % | SYSTOLIC BLOOD PRESSURE: 133 MMHG | BODY MASS INDEX: 36.15 KG/M2 | RESPIRATION RATE: 16 BRPM | HEART RATE: 100 BPM | WEIGHT: 217 LBS | TEMPERATURE: 98.3 F

## 2024-12-26 DIAGNOSIS — R05.1 ACUTE COUGH: ICD-10-CM

## 2024-12-26 DIAGNOSIS — J10.1 INFLUENZA A: Primary | ICD-10-CM

## 2024-12-26 LAB
FLUAV RNA SPEC QL NAA+PROBE: DETECTED
FLUBV RNA SPEC QL NAA+PROBE: NOT DETECTED
S PYO DNA THROAT QL NAA+PROBE: NOT DETECTED
SARS-COV-2 RNA RESP QL NAA+PROBE: NOT DETECTED

## 2024-12-26 PROCEDURE — 87636 SARSCOV2 & INF A&B AMP PRB: CPT

## 2024-12-26 PROCEDURE — 71046 X-RAY EXAM CHEST 2 VIEWS: CPT

## 2024-12-26 PROCEDURE — 6370000000 HC RX 637 (ALT 250 FOR IP): Performed by: EMERGENCY MEDICINE

## 2024-12-26 PROCEDURE — 99284 EMERGENCY DEPT VISIT MOD MDM: CPT

## 2024-12-26 PROCEDURE — 87651 STREP A DNA AMP PROBE: CPT

## 2024-12-26 PROCEDURE — 2500000003 HC RX 250 WO HCPCS: Performed by: EMERGENCY MEDICINE

## 2024-12-26 RX ORDER — GUAIFENESIN 200 MG/10ML
200 LIQUID ORAL
Status: COMPLETED | OUTPATIENT
Start: 2024-12-26 | End: 2024-12-26

## 2024-12-26 RX ORDER — PREDNISONE 20 MG/1
40 TABLET ORAL DAILY
Qty: 8 TABLET | Refills: 0 | Status: SHIPPED | OUTPATIENT
Start: 2024-12-26 | End: 2024-12-30

## 2024-12-26 RX ORDER — DEXTROMETHORPHAN HBR. AND GUAIFENESIN 10; 100 MG/5ML; MG/5ML
10 SOLUTION ORAL
Status: DISCONTINUED | OUTPATIENT
Start: 2024-12-26 | End: 2024-12-26

## 2024-12-26 RX ORDER — IPRATROPIUM BROMIDE AND ALBUTEROL SULFATE 2.5; .5 MG/3ML; MG/3ML
1 SOLUTION RESPIRATORY (INHALATION)
Status: COMPLETED | OUTPATIENT
Start: 2024-12-26 | End: 2024-12-26

## 2024-12-26 RX ORDER — BENZONATATE 100 MG/1
100 CAPSULE ORAL 2 TIMES DAILY PRN
Qty: 14 CAPSULE | Refills: 0 | Status: SHIPPED | OUTPATIENT
Start: 2024-12-26 | End: 2025-01-02

## 2024-12-26 RX ORDER — CODEINE PHOSPHATE AND GUAIFENESIN 10; 100 MG/5ML; MG/5ML
5 SOLUTION ORAL 3 TIMES DAILY PRN
Qty: 80 ML | Refills: 0 | Status: SHIPPED | OUTPATIENT
Start: 2024-12-26 | End: 2024-12-31

## 2024-12-26 RX ORDER — PREDNISONE 20 MG/1
40 TABLET ORAL
Status: COMPLETED | OUTPATIENT
Start: 2024-12-26 | End: 2024-12-26

## 2024-12-26 RX ORDER — ALBUTEROL SULFATE 90 UG/1
2 INHALANT RESPIRATORY (INHALATION) 4 TIMES DAILY PRN
Qty: 18 G | Refills: 0 | Status: SHIPPED | OUTPATIENT
Start: 2024-12-26

## 2024-12-26 RX ORDER — DEXTROMETHORPHAN POLISTIREX 30 MG/5ML
30 SUSPENSION ORAL
Status: COMPLETED | OUTPATIENT
Start: 2024-12-26 | End: 2024-12-26

## 2024-12-26 RX ADMIN — IPRATROPIUM BROMIDE AND ALBUTEROL SULFATE 1 DOSE: 2.5; .5 SOLUTION RESPIRATORY (INHALATION) at 04:28

## 2024-12-26 RX ADMIN — PREDNISONE 40 MG: 20 TABLET ORAL at 04:28

## 2024-12-26 RX ADMIN — Medication 30 MG: at 04:28

## 2024-12-26 RX ADMIN — GUAIFENESIN 200 MG: 200 SOLUTION ORAL at 04:27

## 2024-12-26 ASSESSMENT — PAIN - FUNCTIONAL ASSESSMENT: PAIN_FUNCTIONAL_ASSESSMENT: NONE - DENIES PAIN

## 2024-12-26 ASSESSMENT — LIFESTYLE VARIABLES
HOW MANY STANDARD DRINKS CONTAINING ALCOHOL DO YOU HAVE ON A TYPICAL DAY: PATIENT DOES NOT DRINK
HOW OFTEN DO YOU HAVE A DRINK CONTAINING ALCOHOL: NEVER

## 2024-12-26 NOTE — ED PROVIDER NOTES
_________________________________________________________________              Procedures and Critical Care   Performed by: Brandon Eric DO  Procedures       Disposition: Discharged Home    DISCHARGE: At this time, patient is stable and appropriate for discharge home.  Patient demonstrates understanding of current diagnoses and is in agreement with the treatment plan.  They are advised that while the likelihood of serious underlying condition is low at this point given the evaluation performed today, we cannot fully rule it out.  They are advised to immediately return with any new symptoms or worsening of current condition. Any Incidental findings were noted on the patient's discharge paperwork as well as verbally directly to the patient, and the appropriate follow-up was given to the patient as far as instructions on testing needed as well as the timeframe.  All questions have been answered.  Patient is given educational material regarding their diagnoses, including danger symptoms and when to return to the ED.           Diagnosis:   1. Influenza A    2. Acute cough          PATIENT REFERRED TO:  No follow-up provider specified.    DISCHARGE MEDICATIONS:  New Prescriptions    ALBUTEROL SULFATE HFA (VENTOLIN HFA) 108 (90 BASE) MCG/ACT INHALER    Inhale 2 puffs into the lungs 4 times daily as needed for Wheezing    BENZONATATE (TESSALON) 100 MG CAPSULE    Take 1 capsule by mouth 2 times daily as needed for Cough    GUAIFENESIN-CODEINE (GUAIFENESIN AC) 100-10 MG/5ML LIQUID    Take 5 mLs by mouth 3 times daily as needed for Cough (Night time cough) for up to 5 days. Max Daily Amount: 15 mLs    PREDNISONE (DELTASONE) 20 MG TABLET    Take 2 tablets by mouth daily for 4 days       Brandon Eric DO    Patient seen in the context of the Novel Coronavirus (COVID19) pandemic, utilizing contemporary protocols and evidence based on the most up to date available evidence, understanding that the current evidence has the

## 2024-12-26 NOTE — DISCHARGE INSTRUCTIONS
I sent prednisone that hopefully will help prevent your asthma from getting to flared up.  I also sent a new inhaler for you to use.  I sent cough medicines to use, Tessalon is a good daytime cough medicine, the other medicine that contains codeine and may cause drowsiness and so you should limit this just to nighttime use.  In general it is best to limit the use of cough medicines and cough suppressants    Return if you develop worsening symptoms including new or worsening chest pain, shortness breath, passing out, coughing up blood or any other symptoms that concern you.        Thank you for choosing our Emergency Department for your care.  It is our privilege to care for you in your time of need.  In the next several days, you may receive a survey via email or mailed to your home about your experience with our team.  We would greatly appreciate you taking a few minutes to complete the survey, as we use this information to learn what we have done well and what we could be doing better. Thank you for trusting us with your care!    Below you will find a list of your tests from today's visit.   Labs and Radiology Studies  Recent Results (from the past 12 hour(s))   COVID-19 & Influenza Combo    Collection Time: 12/26/24  4:15 AM    Specimen: Nasopharyngeal   Result Value Ref Range    SARS-CoV-2, PCR Not Detected Not Detected      Rapid Influenza A By PCR DETECTED (A) Not Detected      Rapid Influenza B By PCR Not Detected Not Detected     Group A Strep by PCR    Collection Time: 12/26/24  4:15 AM    Specimen: Swab; Throat   Result Value Ref Range    Strep Grp A PCR Not Detected Not Detected       XR CHEST (2 VW)    Result Date: 12/26/2024  CLINICAL HISTORY: Cough INDICATION:   Cough COMPARISON: 2020 FINDINGS: PA and lateral views of the chest are obtained. The cardiopericardial silhouette is within normal limits. There is no pleural effusion, pneumothorax or focal consolidation present.     No acute intrathoracic

## 2025-02-14 ENCOUNTER — TRANSCRIBE ORDERS (OUTPATIENT)
Facility: HOSPITAL | Age: 40
End: 2025-02-14

## 2025-02-14 DIAGNOSIS — M75.51 BURSITIS OF RIGHT SHOULDER: Primary | ICD-10-CM

## 2025-03-29 PROBLEM — Z13.220 SCREENING FOR LIPID DISORDERS: Status: ACTIVE | Noted: 2025-03-29

## 2025-03-29 PROBLEM — Z12.31 ENCOUNTER FOR SCREENING MAMMOGRAM FOR MALIGNANT NEOPLASM OF BREAST: Status: ACTIVE | Noted: 2025-03-29

## 2025-03-29 PROBLEM — R73.03 PREDIABETES: Status: ACTIVE | Noted: 2025-03-29

## 2025-04-28 PROBLEM — Z12.31 ENCOUNTER FOR SCREENING MAMMOGRAM FOR MALIGNANT NEOPLASM OF BREAST: Status: RESOLVED | Noted: 2025-03-29 | Resolved: 2025-04-28

## 2025-04-28 PROBLEM — Z13.220 SCREENING FOR LIPID DISORDERS: Status: RESOLVED | Noted: 2025-03-29 | Resolved: 2025-04-28

## 2025-08-02 PROBLEM — Z13.220 SCREENING FOR LIPID DISORDERS: Status: ACTIVE | Noted: 2025-08-02

## 2025-08-06 ENCOUNTER — OFFICE VISIT (OUTPATIENT)
Facility: CLINIC | Age: 40
End: 2025-08-06
Payer: COMMERCIAL

## 2025-08-06 VITALS
HEIGHT: 65 IN | HEART RATE: 84 BPM | TEMPERATURE: 98.6 F | RESPIRATION RATE: 18 BRPM | SYSTOLIC BLOOD PRESSURE: 144 MMHG | WEIGHT: 219 LBS | OXYGEN SATURATION: 97 % | BODY MASS INDEX: 36.49 KG/M2 | DIASTOLIC BLOOD PRESSURE: 94 MMHG

## 2025-08-06 DIAGNOSIS — I10 PRIMARY HYPERTENSION: ICD-10-CM

## 2025-08-06 DIAGNOSIS — D64.9 ANEMIA, UNSPECIFIED TYPE: ICD-10-CM

## 2025-08-06 DIAGNOSIS — Z13.220 SCREENING FOR LIPID DISORDERS: Primary | ICD-10-CM

## 2025-08-06 DIAGNOSIS — Z00.00 ANNUAL PHYSICAL EXAM: ICD-10-CM

## 2025-08-06 DIAGNOSIS — K21.9 GASTROESOPHAGEAL REFLUX DISEASE WITHOUT ESOPHAGITIS: ICD-10-CM

## 2025-08-06 DIAGNOSIS — E55.9 VITAMIN D DEFICIENCY: ICD-10-CM

## 2025-08-06 DIAGNOSIS — D57.3 SICKLE CELL TRAIT: ICD-10-CM

## 2025-08-06 DIAGNOSIS — R73.03 PREDIABETES: ICD-10-CM

## 2025-08-06 DIAGNOSIS — Z13.220 SCREENING FOR LIPID DISORDERS: ICD-10-CM

## 2025-08-06 DIAGNOSIS — J30.1 ALLERGIC RHINITIS DUE TO POLLEN, UNSPECIFIED SEASONALITY: ICD-10-CM

## 2025-08-06 DIAGNOSIS — J01.00 ACUTE NON-RECURRENT MAXILLARY SINUSITIS: ICD-10-CM

## 2025-08-06 PROCEDURE — 3080F DIAST BP >= 90 MM HG: CPT | Performed by: INTERNAL MEDICINE

## 2025-08-06 PROCEDURE — 3074F SYST BP LT 130 MM HG: CPT | Performed by: INTERNAL MEDICINE

## 2025-08-06 PROCEDURE — 99214 OFFICE O/P EST MOD 30 MIN: CPT | Performed by: INTERNAL MEDICINE

## 2025-08-06 PROCEDURE — 99396 PREV VISIT EST AGE 40-64: CPT | Performed by: INTERNAL MEDICINE

## 2025-08-06 RX ORDER — BLOOD PRESSURE TEST KIT
KIT MISCELLANEOUS
Qty: 1 KIT | Refills: 1 | Status: SHIPPED | OUTPATIENT
Start: 2025-08-06

## 2025-08-06 RX ORDER — DOXYCYCLINE HYCLATE 100 MG
100 TABLET ORAL 2 TIMES DAILY
Qty: 14 TABLET | Refills: 0 | Status: SHIPPED | OUTPATIENT
Start: 2025-08-06 | End: 2025-08-13

## 2025-08-06 RX ORDER — OMEPRAZOLE 40 MG/1
CAPSULE, DELAYED RELEASE ORAL
COMMUNITY
Start: 2025-07-28

## 2025-08-06 RX ORDER — FLUTICASONE PROPIONATE 50 MCG
2 SPRAY, SUSPENSION (ML) NASAL DAILY
Qty: 16 G | Refills: 4 | Status: SHIPPED | OUTPATIENT
Start: 2025-08-06

## 2025-08-06 RX ORDER — CETIRIZINE HYDROCHLORIDE 10 MG/1
10 TABLET ORAL DAILY
Qty: 30 TABLET | Refills: 2 | Status: SHIPPED | OUTPATIENT
Start: 2025-08-06

## 2025-08-06 RX ORDER — LOSARTAN POTASSIUM 50 MG/1
50 TABLET ORAL DAILY
Qty: 90 TABLET | Refills: 1 | Status: SHIPPED | OUTPATIENT
Start: 2025-08-06

## 2025-08-06 SDOH — ECONOMIC STABILITY: FOOD INSECURITY: WITHIN THE PAST 12 MONTHS, THE FOOD YOU BOUGHT JUST DIDN'T LAST AND YOU DIDN'T HAVE MONEY TO GET MORE.: NEVER TRUE

## 2025-08-06 SDOH — ECONOMIC STABILITY: FOOD INSECURITY: WITHIN THE PAST 12 MONTHS, YOU WORRIED THAT YOUR FOOD WOULD RUN OUT BEFORE YOU GOT MONEY TO BUY MORE.: NEVER TRUE

## 2025-08-06 ASSESSMENT — ENCOUNTER SYMPTOMS
TROUBLE SWALLOWING: 0
SINUS PRESSURE: 1
SORE THROAT: 0
SINUS PAIN: 1
GASTROINTESTINAL NEGATIVE: 1
ALLERGIC/IMMUNOLOGIC NEGATIVE: 1
FACIAL SWELLING: 0
RESPIRATORY NEGATIVE: 1
VOICE CHANGE: 0

## 2025-08-06 ASSESSMENT — PATIENT HEALTH QUESTIONNAIRE - PHQ9
9. THOUGHTS THAT YOU WOULD BE BETTER OFF DEAD, OR OF HURTING YOURSELF: NOT AT ALL
5. POOR APPETITE OR OVEREATING: NOT AT ALL
SUM OF ALL RESPONSES TO PHQ QUESTIONS 1-9: 0
SUM OF ALL RESPONSES TO PHQ QUESTIONS 1-9: 0
6. FEELING BAD ABOUT YOURSELF - OR THAT YOU ARE A FAILURE OR HAVE LET YOURSELF OR YOUR FAMILY DOWN: NOT AT ALL
3. TROUBLE FALLING OR STAYING ASLEEP: NOT AT ALL
2. FEELING DOWN, DEPRESSED OR HOPELESS: NOT AT ALL
8. MOVING OR SPEAKING SO SLOWLY THAT OTHER PEOPLE COULD HAVE NOTICED. OR THE OPPOSITE, BEING SO FIGETY OR RESTLESS THAT YOU HAVE BEEN MOVING AROUND A LOT MORE THAN USUAL: NOT AT ALL
4. FEELING TIRED OR HAVING LITTLE ENERGY: NOT AT ALL
SUM OF ALL RESPONSES TO PHQ QUESTIONS 1-9: 0
SUM OF ALL RESPONSES TO PHQ QUESTIONS 1-9: 0
7. TROUBLE CONCENTRATING ON THINGS, SUCH AS READING THE NEWSPAPER OR WATCHING TELEVISION: NOT AT ALL
10. IF YOU CHECKED OFF ANY PROBLEMS, HOW DIFFICULT HAVE THESE PROBLEMS MADE IT FOR YOU TO DO YOUR WORK, TAKE CARE OF THINGS AT HOME, OR GET ALONG WITH OTHER PEOPLE: NOT DIFFICULT AT ALL
1. LITTLE INTEREST OR PLEASURE IN DOING THINGS: NOT AT ALL

## 2025-08-13 LAB
25(OH)D3+25(OH)D2 SERPL-MCNC: 39.8 NG/ML (ref 30–100)
ALBUMIN SERPL-MCNC: 4.4 G/DL (ref 3.9–4.9)
ALP SERPL-CCNC: 45 IU/L (ref 44–121)
ALT SERPL-CCNC: 19 IU/L (ref 0–32)
AST SERPL-CCNC: 14 IU/L (ref 0–40)
BASOPHILS # BLD AUTO: 0.1 X10E3/UL (ref 0–0.2)
BASOPHILS NFR BLD AUTO: 1 %
BILIRUB SERPL-MCNC: 0.5 MG/DL (ref 0–1.2)
BUN SERPL-MCNC: 11 MG/DL (ref 6–24)
BUN/CREAT SERPL: 15 (ref 9–23)
CALCIUM SERPL-MCNC: 9.8 MG/DL (ref 8.7–10.2)
CHLORIDE SERPL-SCNC: 101 MMOL/L (ref 96–106)
CO2 SERPL-SCNC: 23 MMOL/L (ref 20–29)
CREAT SERPL-MCNC: 0.71 MG/DL (ref 0.57–1)
EGFRCR SERPLBLD CKD-EPI 2021: 109 ML/MIN/1.73
EOSINOPHIL # BLD AUTO: 0.2 X10E3/UL (ref 0–0.4)
EOSINOPHIL NFR BLD AUTO: 2 %
ERYTHROCYTE [DISTWIDTH] IN BLOOD BY AUTOMATED COUNT: 15.7 % (ref 11.7–15.4)
EST. AVERAGE GLUCOSE BLD GHB EST-MCNC: 126 MG/DL
FERRITIN SERPL-MCNC: 34 NG/ML (ref 15–150)
GLOBULIN SER CALC-MCNC: 2.7 G/DL (ref 1.5–4.5)
GLUCOSE SERPL-MCNC: 100 MG/DL (ref 70–99)
HBA1C MFR BLD: 6 % (ref 4.8–5.6)
HCT VFR BLD AUTO: 44.5 % (ref 34–46.6)
HGB BLD-MCNC: 13.2 G/DL (ref 11.1–15.9)
IMM GRANULOCYTES # BLD AUTO: 0 X10E3/UL (ref 0–0.1)
IMM GRANULOCYTES NFR BLD AUTO: 0 %
IRON SATN MFR SERPL: 18 % (ref 15–55)
IRON SERPL-MCNC: 65 UG/DL (ref 27–159)
LYMPHOCYTES # BLD AUTO: 2.7 X10E3/UL (ref 0.7–3.1)
LYMPHOCYTES NFR BLD AUTO: 31 %
MCH RBC QN AUTO: 23.7 PG (ref 26.6–33)
MCHC RBC AUTO-ENTMCNC: 29.7 G/DL (ref 31.5–35.7)
MCV RBC AUTO: 80 FL (ref 79–97)
MONOCYTES # BLD AUTO: 0.5 X10E3/UL (ref 0.1–0.9)
MONOCYTES NFR BLD AUTO: 6 %
NEUTROPHILS # BLD AUTO: 5.3 X10E3/UL (ref 1.4–7)
NEUTROPHILS NFR BLD AUTO: 60 %
PLATELET # BLD AUTO: 348 X10E3/UL (ref 150–450)
POTASSIUM SERPL-SCNC: 4.5 MMOL/L (ref 3.5–5.2)
PROT SERPL-MCNC: 7.1 G/DL (ref 6–8.5)
RBC # BLD AUTO: 5.58 X10E6/UL (ref 3.77–5.28)
SODIUM SERPL-SCNC: 142 MMOL/L (ref 134–144)
TIBC SERPL-MCNC: 370 UG/DL (ref 250–450)
TSH SERPL DL<=0.005 MIU/L-ACNC: 1.1 UIU/ML (ref 0.45–4.5)
UIBC SERPL-MCNC: 305 UG/DL (ref 131–425)
WBC # BLD AUTO: 8.7 X10E3/UL (ref 3.4–10.8)

## (undated) DEVICE — SUTURE VICRYL + SZ 0 L27IN ABSRB VLT L26MM UR-6 5/8 CIR VCP603H

## (undated) DEVICE — SEALER LAP L37CM MARYLAND JAW OPN NANO COAT MULTIFUNCTIONAL

## (undated) DEVICE — GLOVE SURG SZ 65 L12IN FNGR THK79MIL GRN LTX FREE

## (undated) DEVICE — TROCAR: Brand: KII® SLEEVE

## (undated) DEVICE — PAD POSITIONING WNG STD KIT W/BODY STRP LF DISP

## (undated) DEVICE — SYRINGE MED 10ML LUERLOCK TIP W/O SFTY DISP

## (undated) DEVICE — LIQUIBAND RAPID ADHESIVE 36/CS 0.8ML: Brand: MEDLINE

## (undated) DEVICE — TROCAR: Brand: KII FIOS FIRST ENTRY

## (undated) DEVICE — PAD,NON-ADHERENT,W/AD,3X4,ST,LF,1/PK: Brand: MEDLINE

## (undated) DEVICE — YANKAUER,BULB TIP,W/O VENT,RIGID,STERILE: Brand: MEDLINE

## (undated) DEVICE — SOLUTION IRRIG 1000ML 0.9% SOD CHL USP POUR PLAS BTL

## (undated) DEVICE — PENCIL ES CRD L10FT HND SWCHING ROCK SWCH W/ EDGE COAT BLDE

## (undated) DEVICE — SPONGE GZ W4XL4IN COT RADPQ HIGHLY ABSRB

## (undated) DEVICE — SOLUTION IV 1000ML 0.9% SOD CHL PH 5 INJ USP VIAFLX PLAS

## (undated) DEVICE — TUBING FLTR PLUME AWAY EVAC W/ SUCT DEV DISP PUREVIEW

## (undated) DEVICE — LAPAROSCOPIC TROCAR SLEEVE/SINGLE USE: Brand: KII® OPTICAL ACCESS SYSTEM

## (undated) DEVICE — DRAPE,REIN 53X77,STERILE: Brand: MEDLINE

## (undated) DEVICE — SYR 10ML LUER LOK 1/5ML GRAD --

## (undated) DEVICE — GYN LAPAROSCOPY-SFMC: Brand: MEDLINE INDUSTRIES, INC.

## (undated) DEVICE — ELECTRODE ES L11CM DIA5MM BALL SHFT RED DISP UTAHLOOP

## (undated) DEVICE — SOLUTION IRRIG 3000ML 0.9% SOD CHL USP UROMATIC PLAS CONT

## (undated) DEVICE — SOLUTION IRRIG 1000ML STRL H2O USP PLAS POUR BTL

## (undated) DEVICE — GLOVE ORANGE PI 7 1/2   MSG9075

## (undated) DEVICE — TROCAR: Brand: KII SLEEVE

## (undated) DEVICE — ELECTRODE PT RET AD L9FT HI MOIST COND ADH HYDRGEL CORDED

## (undated) DEVICE — SUTURE VICRYL SZ 0 L36IN ABSRB UD CT-1 L36MM 1/2 CIR TAPR PNT VCP946H

## (undated) DEVICE — PERI GYN-SFMC: Brand: MEDLINE INDUSTRIES, INC.

## (undated) DEVICE — GLOVE SURG SZ 6 THK91MIL LTX FREE SYN POLYISOPRENE ANTI

## (undated) DEVICE — COUNTER NDL 20 COUNT HLD 40 DBL MAG ADH

## (undated) DEVICE — NEEDLE SPNL 22GA L3.5IN BLK HUB S STL REG WALL FIT STYL W/

## (undated) DEVICE — STERILE POLYISOPRENE POWDER-FREE SURGICAL GLOVES WITH EMOLLIENT COATING: Brand: PROTEXIS

## (undated) DEVICE — ELECTRODE LAP L36CM PTFE WIRE J HK CLEANCOAT

## (undated) DEVICE — SPONGE GZ W4XL4IN COT RADPQ HIGHLY ABSRB STERILE

## (undated) DEVICE — SYRINGE MED 10ML TRNSLUC BRL PLUNG BLK MRK POLYPR CTRL

## (undated) DEVICE — Device

## (undated) DEVICE — SUTURE MONOCRYL SZ 4-0 L27IN ABSRB UD L19MM PS-2 1/2 CIR PRIM Y426H

## (undated) DEVICE — TROCAR: Brand: KII OPTICAL ACCESS SYSTEM

## (undated) DEVICE — FLUID MGMT SYS FLUENT KIT 6/PK

## (undated) DEVICE — SYSTEM SMK EVAC LAP TBNG FILTER HSNG BENT STYL PNK SEE CLR

## (undated) DEVICE — SUTURE MCRYL SZ 4-0 L27IN ABSRB UD L19MM PS-2 1/2 CIR PRIM Y426H

## (undated) DEVICE — BAG SPEC REM 224ML W4XL6IN DIA10MM 1 HND GYN DISP ENDOPCH

## (undated) DEVICE — TUBING, SUCTION, 1/4" X 12', STRAIGHT: Brand: MEDLINE

## (undated) DEVICE — SCISSORS ENDOSCP DIA5MM CRV MPLR CAUT W/ RATCH HNDL